# Patient Record
Sex: FEMALE | Race: WHITE | Employment: OTHER | URBAN - METROPOLITAN AREA
[De-identification: names, ages, dates, MRNs, and addresses within clinical notes are randomized per-mention and may not be internally consistent; named-entity substitution may affect disease eponyms.]

---

## 2017-06-11 DIAGNOSIS — E78.5 HYPERLIPIDEMIA, UNSPECIFIED HYPERLIPIDEMIA TYPE: ICD-10-CM

## 2017-06-12 RX ORDER — ATORVASTATIN CALCIUM 10 MG/1
TABLET, FILM COATED ORAL
Qty: 90 TAB | Refills: 3 | Status: SHIPPED | OUTPATIENT
Start: 2017-06-12 | End: 2017-07-13 | Stop reason: SDUPTHER

## 2017-07-05 ENCOUNTER — HOSPITAL ENCOUNTER (OUTPATIENT)
Dept: LAB | Age: 62
Discharge: HOME | End: 2017-07-05
Payer: COMMERCIAL

## 2017-07-05 LAB
CHOLEST SERPL-MCNC: 173 MG/DL
HBA1C MFR BLD: 5.5 % (ref 4.2–5.6)
HDLC SERPL-MCNC: 60 MG/DL (ref 40–60)
HDLC SERPL: 2.9 {RATIO} (ref 0–5)
LDLC SERPL CALC-MCNC: 72.4 MG/DL (ref 0–100)
LIPID PROFILE,FLP: ABNORMAL
TRIGL SERPL-MCNC: 203 MG/DL (ref ?–150)
VLDLC SERPL CALC-MCNC: 40.6 MG/DL

## 2017-07-05 PROCEDURE — 83036 HEMOGLOBIN GLYCOSYLATED A1C: CPT | Performed by: INTERNAL MEDICINE

## 2017-07-05 PROCEDURE — 80061 LIPID PANEL: CPT | Performed by: INTERNAL MEDICINE

## 2017-07-05 PROCEDURE — 36415 COLL VENOUS BLD VENIPUNCTURE: CPT | Performed by: INTERNAL MEDICINE

## 2017-07-09 NOTE — PROGRESS NOTES
59 y.o. white female who presents for eval    She continues to do well from spine standpoint    No cardiovascular complaints and she's back to her routine exercise    Denies polyuria, polydipsia, nocturia, vision change. Not checking sugars at this time. Denies GI or Gu complaints    Tragically, her   suddenly last week while they were in the process of going to the doctor to be evaluated. The  felt he  from ruptured AAA although no autopsy was done. She's grieving but denies aaliyah depression, sleeping ok    Past Medical History:   Diagnosis Date    Colon polyp 2017    Dr Austin Pritchett    DJD (degenerative joint disease)     FHx: heart disease     Hyperlipidemia     calc risk score 1.9 from (); taking statins due to strong FH    IFG (impaired fasting glucose)     Macular degeneration     Dr. Armani Olvera Osteopenia     DEXA t score spine 3.5, hip -2.0 (); 0.9 spine, -2.0 hip FRAX 10/1.4 ();  4.5 spine, -2.1 hip (10/16) Dr Maegan Reeves;  Fosamax [phone]    Right sided sciatica 2016    Dr Thao Hays in [facility]; Dr Gil Jones; [facility]  showed severe multilvevel degen change, large right L4-5 synovial cyst w impingment of R L5 with moderate to severe central stenosis, severe L3-4, mod L2-3 central stenosis and L2-3 left, L3-4bilat, L4-5 left foraminal stenosis    Zoster       Past Surgical History:   Procedure Laterality Date    HX BLADDER SUSPENSION  2006    Dr. Xiao Pritchett  hemorrhoids; 3/17 polyp    HX DILATION AND CURETTAGE      HX DILATION AND CURETTAGE      HX GYN      conization    HX HYSTERECTOMY      NEUROLOGICAL PROCEDURE UNLISTED  2016    partial right L4-5 laminectomy for resection of large synovial cyst Dr Monika Forrester Marital status:      Spouse name: N/A    Number of children: 1    Years of education: N/A     Occupational History          Social History Main Topics    Smoking status: Never Smoker    Smokeless tobacco: Never Used    Alcohol use 0.0 oz/week     0 Standard drinks or equivalent per week      Comment: social    Drug use: No    Sexual activity: Not on file     Other Topics Concern    Not on file     Social History Narrative     Current Outpatient Prescriptions   Medication Sig    atorvastatin (LIPITOR) 10 mg tablet TAKE 1 TAB BY MOUTH DAILY.  ANTIOX #8/OM3/DHA/EPA/LUT/ZEAX (PRESERVISION AREDS 2, OMEGA-3, PO) Take  by mouth.  estradiol (ESTRACE) 1 mg tablet Take 1 mg by mouth daily.  conjugated estrogens (PREMARIN) 0.625 mg/gram vaginal cream Insert 0.5 g into vagina daily.  clobetasol (TEMOVATE) 0.05 % ointment Apply  to affected area two (2) times a day.  omega-3 fatty acids-vitamin e (FISH OIL) 1,000 mg Cap Take 1 Cap by mouth.  calcium-cholecalciferol, d3, (CALCIUM 600 + D) 600-125 mg-unit Tab Take  by mouth.  MULTIVITAMIN PO Take  by mouth. No current facility-administered medications for this visit.       Allergies   Allergen Reactions    Qsymia [Phentermine-Topiramate] Other (comments)     Insomnia       REVIEW OF SYSTEMS:  Dr. Pan Matias 2016, mammo 5/16, DEXA 10/16, colo 3/17 Dr Chaim Pemberton  Ophtho - no vision change or eye pain  Oral - no mouth pain, tongue or tooth problems  Ears - no hearing loss, ear pain, fullness, no swallowing problems  Cardiac - no CP, PND, orthopnea, edema, palpitations or syncope  Chest - no breast masses  Resp - no wheezing, chronic coughing, dyspnea  GI - no heartburn, nausea, vomiting, change in bowel habits, bleeding, hemorrhoids  Urinary - no dysuria, hematuria, flank pain, urgency, frequency  Constitutional - no wt loss, night sweats, unexplained fevers  Neuro - no focal weakness, numbness, paresthesias, incoordination, ataxia, involuntary movements  Endo - no polyuria, polydipsia, nocturia, hot flashes    Visit Vitals    /82 (BP 1 Location: Right arm, BP Patient Position: Sitting)    Pulse 95    Temp 98.3 °F (36.8 °C) (Oral)    Resp 17    Ht 5' 3\" (1.6 m)    Wt 145 lb 8 oz (66 kg)    SpO2 97%    BMI 25.77 kg/m2     A&O x3  Affect is appropriate. Mood stable  No apparent distress  Anicteric, no JVD, adenopathy or thyromegaly. No carotid bruits or radiated murmur  Lungs clear to auscultation, no wheezes or rales  Heart showed regular rate and rhythm. No murmur, rubs, gallops  Abdomen soft nontender, no hepatosplenomegaly or masses. Extremities without edema.   Pulses 1-2+ symmetrically    LABS  From 1/12 showed   gluc 94,   cr 0.74, gfr 90,  alt 17,                   ldl-p 2079, chol 246, tg 154, hdl 53, ldl-c 162, wbc 5.0, hb 12.4, plt 275  From 5/12 showed   gluc 106, cr 0.82                         hba1c 5.8, ldl-p 1162, chol 159, tg 150, hdl 49, ldl-c 80,         tsh 1.35  From 11/12 showed gluc 100, cr 0.89                         hba1c 5.8, ldl-p 976,   chol 152, tg 167, hdl 49, ldl-c 70  From 5/13 showed                   hba1c 5.8,                   chol 165, tg 111, hdl 54, ldl-c 89  From 12/13 showed gluc 99,   cr 0.92, gfr 68,  alt 12, hba1c 5.7,               chol 155, tg 115, hdl 51, ldl-c 81  From 6/14 showed   gluc 92,   cr 1.03, gfr 55,  alt 13, hba1c 5.6,     chol 161, tg 98,   hdl 54, ldl-c 87,  wbc 5.6, hb 13.6, plt 257, tsh 1.09  From 12/14 showed        hba1c 5.8,     chol 158, tg 108, hdl 64, ldl-c 72  From 6/15 showed   gluc 98,   cr 0.89, gfr>60, alt 10, hba1c 5.9,     chol 151, tg 81,   hdl 56, ldl-c 79  From 12/15 showed        hba1c 5.6,     chol 161, tg 141, hdl 68, ldl-c 65  From 6/16 showed   gluc 99,   cr 0.88, gfr>60,   hba1c 5.8,               wbc 5.6, hb 12.9, plt 252, hep c neg  From 11/16 showed gluc 93,   cr 0.75, gfr>60,                 wbc 6.1, hb 12.1, plt 247  From 12/16 showed        hba1c 5.0,     chol 181, tg 119, hdl 76, ldl-c 81    Results for orders placed or performed during the hospital encounter of 07/05/17   LIPID PANEL   Result Value Ref Range    LIPID PROFILE          Cholesterol, total 173 <200 MG/DL    Triglyceride 203 (H) <150 MG/DL    HDL Cholesterol 60 40 - 60 MG/DL    LDL, calculated 72.4 0 - 100 MG/DL    VLDL, calculated 40.6 MG/DL    CHOL/HDL Ratio 2.9 0 - 5.0     HEMOGLOBIN A1C W/O EAG   Result Value Ref Range    Hemoglobin A1c 5.5 4.2 - 5.6 %     Patient Active Problem List   Diagnosis Code    Osteopenia FRAX 8/14 10 and 1.4 M85.80    Hyperlipidemia E78.5    Macular degeneration Dr. Theodore Kennedy H35.30    IFG (impaired fasting glucose) R73.01    Arthritis, degenerative M19.90    GERD without esophagitis K21.9    Advance directive in chart Z78.9     ASSESSMENT AND PLAN  1.  DJD. Prn nsaids  2. AMD.  F/U Dr. Theodore Kennedy  3. GERD. Prn PPI and avoidance measures  4. Osteopenia. Ca/D/wt bearing exercises. 5.  Hyperlipidemia and strong FH chd.  Continue lipitor and at target  6. IFG. Weight loss, continue dietary and lifestyle measures  7. Colon polyp. Fiber, colo 2022 Dr Rigoberto Berry  8. Spine. F/U Dr Christopher Altamirano  9. Grief reaction. Long discussion, she wants no meds for now and will call if things worsen      RTC 1/18    Above conditions discussed at length and patient vocalized understanding.   All questions answered to patient satifaction

## 2017-07-13 ENCOUNTER — OFFICE VISIT (OUTPATIENT)
Dept: INTERNAL MEDICINE CLINIC | Age: 62
End: 2017-07-13

## 2017-07-13 VITALS
TEMPERATURE: 98.3 F | SYSTOLIC BLOOD PRESSURE: 134 MMHG | HEART RATE: 95 BPM | OXYGEN SATURATION: 97 % | BODY MASS INDEX: 25.78 KG/M2 | DIASTOLIC BLOOD PRESSURE: 82 MMHG | RESPIRATION RATE: 17 BRPM | WEIGHT: 145.5 LBS | HEIGHT: 63 IN

## 2017-07-13 DIAGNOSIS — E78.5 HYPERLIPIDEMIA, UNSPECIFIED HYPERLIPIDEMIA TYPE: ICD-10-CM

## 2017-07-13 DIAGNOSIS — R73.01 IFG (IMPAIRED FASTING GLUCOSE): Primary | ICD-10-CM

## 2017-07-13 RX ORDER — ATORVASTATIN CALCIUM 10 MG/1
TABLET, FILM COATED ORAL
Qty: 90 TAB | Refills: 3 | Status: SHIPPED | OUTPATIENT
Start: 2017-07-13 | End: 2018-06-18 | Stop reason: SDUPTHER

## 2017-07-13 NOTE — MR AVS SNAPSHOT
Visit Information Date & Time Provider Department Dept. Phone Encounter #  
 7/13/2017  1:30 PM Brooke Lin MD Internist of [facility] [address] [id number] Your Appointments 3/15/2018  9:35 AM  
LAB with [facility] NURSE VISIT Internist of [facility] ([facility]) Appt Note: lab  
 [address] [facility], [address] [place], [place] [address]e [address] [place]  
  
   
 [address] [facility]  
  
    
 3/22/2018 10:00 AM  
PHYSICAL with Brooke Lin MD  
Internist of [facility]) Appt Note: pe pt will be out of town [address], [address] [place] [address]  
  
   
 [address], [facility] Upcoming Health Maintenance Date Due  
 PAP AKA CERVICAL CYTOLOGY 10/23/1974 INFLUENZA AGE 9 TO ADULT 8/1/2017 BREAST CANCER SCRN MAMMOGRAM 6/14/2018 DTaP/Tdap/Td series (2 - Td) 6/11/2024 COLONOSCOPY 3/28/2027 Allergies as of 7/13/2017  Review Complete On: 7/13/2017 By: Kym Zhao LPN Severity Noted Reaction Type Reactions Qsymia [Phentermine-topiramate]  12/06/2013    Other (comments) Insomnia Current Immunizations  Reviewed on 12/11/2014 Name Date Tdap 6/11/2014 Zoster Vaccine, Live 12/11/2014  3:11 PM  
  
 Not reviewed this visit You Were Diagnosed With   
  
 Codes Comments Hyperlipidemia, unspecified hyperlipidemia type     ICD-10-CM: E78.5 ICD-9-CM: 272.4 Vitals BP Pulse Temp Resp Height(growth percentile) Weight(growth percentile) (!) 162/92 (BP 1 Location: Right arm, BP Patient Position: Sitting) 95 98.3 °F (36.8 °C) (Oral) 17 5' 3\" (1.6 m) 145 lb 8 oz (66 kg) SpO2 BMI OB Status Smoking Status 97% 25.77 kg/m2 Hysterectomy Never Smoker Vitals History BMI and BSA Data Body Mass Index Body Surface Area 25.77 kg/m 2 1.71 m 2 Preferred Pharmacy Pharmacy Name Phone [facility]/PHARMACY #[id number] MADRIGAL [phone] Your Updated Medication List  
  
   
This list is accurate as of: 7/13/17  2:11 PM.  Always use your most recent med list.  
  
  
  
  
 atorvastatin 10 mg tablet Commonly known as:  LIPITOR  
TAKE 1 TAB BY MOUTH DAILY. CALCIUM 600 + D 600-125 mg-unit Tab Generic drug:  calcium-cholecalciferol (d3) Take  by mouth.  
  
 clobetasol 0.05 % ointment Commonly known as:  Cheyanne Carey Apply  to affected area two (2) times a day. ESTRACE 1 mg tablet Generic drug:  estradiol Take 1 mg by mouth daily. FISH OIL 1,000 mg Cap Generic drug:  omega-3 fatty acids-vitamin e Take 1 Cap by mouth. MULTIVITAMIN PO Take  by mouth. PREMARIN 0.625 mg/gram vaginal cream  
Generic drug:  conjugated estrogens Insert 0.5 g into vagina daily. PRESERVISION AREDS 2 (OMEGA-3) PO Take  by mouth. Introducing [facility]! Dear Gifty Almeida: 
Thank you for requesting a [email or web address] account. Our records indicate that you already have an active [email or web address] account. You can access your account anytime at [email or web address] Did you know that you can access your hospital and ER discharge instructions at any time in [email or web address]? You can also review all of your test results from your hospital stay or ER visit. Additional Information If you have questions, please visit the Frequently Asked Questions section of the [email or web address] website at [email or web address] Remember, [email or web address] is NOT to be used for urgent needs. For medical emergencies, dial 911. Now available from your iPhone and Android! Please provide this summary of care documentation to your next provider. Your primary care clinician is listed as Katelynn Agosto. If you have any questions after today's visit, please call [phone].

## 2017-08-01 ENCOUNTER — HOSPITAL ENCOUNTER (OUTPATIENT)
Dept: MAMMOGRAPHY | Age: 62
Discharge: HOME | End: 2017-08-01
Attending: INTERNAL MEDICINE
Payer: COMMERCIAL

## 2017-08-01 DIAGNOSIS — Z12.31 VISIT FOR SCREENING MAMMOGRAM: ICD-10-CM

## 2017-08-01 PROCEDURE — 77067 SCR MAMMO BI INCL CAD: CPT

## 2017-10-06 ENCOUNTER — OFFICE VISIT (OUTPATIENT)
Dept: INTERNAL MEDICINE CLINIC | Age: 62
End: 2017-10-06

## 2017-10-06 VITALS
WEIGHT: 141 LBS | OXYGEN SATURATION: 99 % | TEMPERATURE: 98.1 F | BODY MASS INDEX: 24.98 KG/M2 | RESPIRATION RATE: 14 BRPM | SYSTOLIC BLOOD PRESSURE: 150 MMHG | DIASTOLIC BLOOD PRESSURE: 86 MMHG | HEIGHT: 63 IN | HEART RATE: 74 BPM

## 2017-10-06 DIAGNOSIS — H61.21 IMPACTED CERUMEN OF RIGHT EAR: Primary | ICD-10-CM

## 2017-10-06 NOTE — PROGRESS NOTES
1. Have you been to the ER, urgent care clinic or hospitalized since your last visit? NO.     2. Have you seen or consulted any other health care providers outside of the [address] [place] [address] since your last visit (Include any pap smears or colon screening)? NO      Do you have an Advanced Directive?  YES

## 2017-10-06 NOTE — MR AVS SNAPSHOT
Visit Information Date & Time Provider Department Dept. Phone Encounter #  
 10/6/2017 11:30 AM Dasha Marshallma Internist of [address] [id number] Your Appointments 3/28/2018 10:05 AM  
LAB with [facility] NURSE VISIT Internist of [facility] Appt Note: lab; lab  
 [address] [facility] [address], [address] [facility] [address]  
  
   
 [address] [facility] [phone]  
  
    
 4/5/2018  2:00 PM  
PHYSICAL with Jackson Macdonald MD  
Internist of [facility]) Appt Note: pe pt will be out of town [address]; pe  
 [address] [facility] [address], [address] [facility] [address]  
  
   
 [address], [facility] Upcoming Health Maintenance Date Due INFLUENZA AGE 9 TO ADULT 8/1/2017 BREAST CANCER SCRN MAMMOGRAM 8/1/2019 DTaP/Tdap/Td series (2 - Td) 6/11/2024 COLONOSCOPY 3/28/2027 Allergies as of 10/6/2017  Review Complete On: 10/6/2017 By: ERVIN Marshall Severity Noted Reaction Type Reactions Qsymia [Phentermine-topiramate]  12/06/2013    Other (comments) Insomnia Current Immunizations  Reviewed on 12/11/2014 Name Date Tdap 6/11/2014 Zoster Vaccine, Live 12/11/2014  3:11 PM  
  
 Not reviewed this visit You Were Diagnosed With   
  
 Codes Comments Impacted cerumen of right ear    -  Primary ICD-10-CM: H61.21 ICD-9-CM: 380.4 Vitals BP Pulse Temp Resp Height(growth percentile) Weight(growth percentile) 150/86 (BP 1 Location: Right arm, BP Patient Position: Sitting) 74 98.1 °F (36.7 °C) (Oral) 14 5' 3\" (1.6 m) 141 lb (64 kg) SpO2 BMI OB Status Smoking Status 99% 24.98 kg/m2 Hysterectomy Never Smoker Vitals History BMI and BSA Data Body Mass Index Body Surface Area 24.98 kg/m 2 1.69 m 2 Preferred Pharmacy Pharmacy Name Phone [facility]/PHARMACY #[id number]- Aidan ROLON [phone] Your Updated Medication List  
  
   
This list is accurate as of: 10/6/17 12:01 PM.  Always use your most recent med list.  
  
  
  
  
 atorvastatin 10 mg tablet Commonly known as:  LIPITOR  
TAKE 1 TAB BY MOUTH DAILY. CALCIUM 600 + D 600-125 mg-unit Tab Generic drug:  calcium-cholecalciferol (d3) Take  by mouth.  
  
 clobetasol 0.05 % ointment Commonly known as:  Sage Jackson Apply  to affected area two (2) times a day. ESTRACE 1 mg tablet Generic drug:  estradiol Take 1 mg by mouth daily. FISH OIL 1,000 mg Cap Generic drug:  omega-3 fatty acids-vitamin e Take 1 Cap by mouth. MULTIVITAMIN PO Take  by mouth. PREMARIN 0.625 mg/gram vaginal cream  
Generic drug:  conjugated estrogens Insert 0.5 g into vagina daily. PRESERVISION AREDS 2 (OMEGA-3) PO Take  by mouth. Introducing [facility]! Dear Ranjeet Del Cid: 
Thank you for requesting a mWater account. Our records indicate that you already have an active mWater account. You can access your account anytime at [email or web address] Did you know that you can access your hospital and ER discharge instructions at any time in mWater? You can also review all of your test results from your hospital stay or ER visit. Additional Information If you have questions, please visit the Frequently Asked Questions section of the mWater website at [email or web address] Remember, mWater is NOT to be used for urgent needs. For medical emergencies, dial 911. Now available from your iPhone and Android! Please provide this summary of care documentation to your next provider. Your primary care clinician is listed as Jaleesa Lozoya. If you have any questions after today's visit, please call [phone].

## 2017-10-06 NOTE — PROGRESS NOTES
HPI/History  Robin Eid is a 59 y.o.  female who presents for evaluation. Pt reports right ear fullness for at least 2 wks but probably longer. Present more than absent and sometimes with muffled hearing. No pain, otorrhea, or other complaints. No URI/cold/allergy sxs. Has tried softeners but no cerumen noted to be removed. Last softener use was last night. No other sxs or complaints. Patient Active Problem List   Diagnosis Code    Osteopenia FRAX 8/14 10 and 1.4 M85.80    Hyperlipidemia E78.5    Macular degeneration Dr. Que Guevara H35.30    IFG (impaired fasting glucose) R73.01    Arthritis, degenerative M19.90    GERD without esophagitis K21.9    Advance directive in chart Z78.9     Past Medical History:   Diagnosis Date    Colon polyp 03/2017    Dr Kiko FIELDSD (degenerative joint disease)     FHx: heart disease     Hyperlipidemia     calc risk score 1.9 from (1/12); taking statins due to strong FH    IFG (impaired fasting glucose) 5/12    Macular degeneration     Dr. Lili Barrett Osteopenia     DEXA t score spine 3.5, hip -2.0 (5/12); 0.9 spine, -2.0 hip FRAX 10/1.4 (8/14);  4.5 spine, -2.1 hip (10/16) Dr Larry Carbajal;  Fosamax [phone]    Right sided sciatica 03/2016    Dr Chavez Chavarria in [facility]; Dr Naren Art; [facility] 7/16 showed severe multilvevel degen change, large right L4-5 synovial cyst w impingment of R L5 with moderate to severe central stenosis, severe L3-4, mod L2-3 central stenosis and L2-3 left, L3-4bilat, L4-5 left foraminal stenosis    Zoster 1980s      Past Surgical History:   Procedure Laterality Date    HX BLADDER SUSPENSION  4/2006    Dr. Silvino Valerio 2006 hemorrhoids; 3/17 polyp    HX DILATION AND CURETTAGE  1985    HX DILATION AND CURETTAGE  1988    HX GYN      conization    HX HYSTERECTOMY  2001    NEUROLOGICAL PROCEDURE UNLISTED  11/2016    partial right L4-5 laminectomy for resection of large synovial cyst Dr Araseli Vazquez History Social History    Marital status:      Spouse name: N/A    Number of children: 1    Years of education: N/A     Occupational History          Social History Main Topics    Smoking status: Never Smoker    Smokeless tobacco: Never Used    Alcohol use 0.0 oz/week     0 Standard drinks or equivalent per week      Comment: social    Drug use: No    Sexual activity: Not on file     Other Topics Concern    Not on file     Social History Narrative     Family History   Problem Relation Age of Onset    Heart Disease Mother     Other Father      ESRD    Heart Disease Father     Heart Disease Brother     Elevated Lipids Brother     Diabetes Brother     Cancer Maternal Aunt 54     breast    Diabetes Other      maternal cousin breast     Current Outpatient Prescriptions   Medication Sig    atorvastatin (LIPITOR) 10 mg tablet TAKE 1 TAB BY MOUTH DAILY.  ANTIOX #8/OM3/DHA/EPA/LUT/ZEAX (PRESERVISION AREDS 2, OMEGA-3, PO) Take  by mouth.  estradiol (ESTRACE) 1 mg tablet Take 1 mg by mouth daily.  conjugated estrogens (PREMARIN) 0.625 mg/gram vaginal cream Insert 0.5 g into vagina daily.  clobetasol (TEMOVATE) 0.05 % ointment Apply  to affected area two (2) times a day.  calcium-cholecalciferol, d3, (CALCIUM 600 + D) 600-125 mg-unit Tab Take  by mouth.  MULTIVITAMIN PO Take  by mouth.  omega-3 fatty acids-vitamin e (FISH OIL) 1,000 mg Cap Take 1 Cap by mouth. No current facility-administered medications for this visit. Allergies   Allergen Reactions    Qsymia [Phentermine-Topiramate] Other (comments)     Insomnia         Review of Systems  Aside from those included in HPI, remainder of ROS negative.     Physical Examination  Visit Vitals    /86 (BP 1 Location: Right arm, BP Patient Position: Sitting)    Pulse 74    Temp 98.1 °F (36.7 °C) (Oral)    Resp 14    Ht 5' 3\" (1.6 m)    Wt 141 lb (64 kg)    SpO2 99%    BMI 24.98 kg/m2       General - Alert and in no acute distress. Pt appears well, comfortable, and in good spirits. Pleasant, engaging. Nontoxic. Not anxious, non-diaphoretic. Mental status - Appropriate mood, behavior, speech content, dress, and thought processes. Eyes - No periorbital findings. Pupils equal and reactive, extraocular movements intact. No erythema or discharge. Ears - No external findings or discomfort with manipulation. No mastoidal findings. Right canal with light colored cerumen impaction. Left canal and TM unremarkable. Hearing intact. Nose - No erythema. No rhinorrhea. Pulm - No tachypnea, retractions, or cyanosis. Good respiratory effort. Cardiovascular - Normal rate. Partial success with irrigation today. No complications or issues. No signs of infection. However, with our limited success, I decided to stop irrigation and refer to ENT. Assessment and Plan  1. Right cerumen impaction - Partial success with irrigation today, no complications. May be enough but will refer to ENT in case further tx needed. Pt will be out of town sporadically in the near future and next available 10/16-18. Further planning as warranted. Pt happily agrees with plan. PLEASE NOTE:   This document has been produced using voice recognition software. Unrecognized errors in transcription may be present.     Nina Acevedo [employer] of [place] Cee  [phone]  10/6/2017

## 2018-03-28 ENCOUNTER — HOSPITAL ENCOUNTER (OUTPATIENT)
Dept: LAB | Age: 63
Discharge: HOME | End: 2018-03-28
Payer: COMMERCIAL

## 2018-03-28 ENCOUNTER — APPOINTMENT (OUTPATIENT)
Dept: INTERNAL MEDICINE CLINIC | Age: 63
End: 2018-03-28

## 2018-03-28 DIAGNOSIS — E78.5 HYPERLIPIDEMIA, UNSPECIFIED HYPERLIPIDEMIA TYPE: ICD-10-CM

## 2018-03-28 LAB
ALBUMIN SERPL-MCNC: 3.8 G/DL (ref 3.4–5)
ALBUMIN/GLOB SERPL: 1.1 {RATIO} (ref 0.8–1.7)
ALP SERPL-CCNC: 67 U/L (ref 45–117)
ALT SERPL-CCNC: 23 U/L (ref 13–56)
ANION GAP SERPL CALC-SCNC: 5 MMOL/L (ref 3–18)
AST SERPL-CCNC: 17 U/L (ref 15–37)
BILIRUB SERPL-MCNC: 0.9 MG/DL (ref 0.2–1)
BUN SERPL-MCNC: 18 MG/DL (ref 7–18)
BUN/CREAT SERPL: 23 (ref 12–20)
CALCIUM SERPL-MCNC: 9.2 MG/DL (ref 8.5–10.1)
CHLORIDE SERPL-SCNC: 105 MMOL/L (ref 100–108)
CO2 SERPL-SCNC: 31 MMOL/L (ref 21–32)
CREAT SERPL-MCNC: 0.8 MG/DL (ref 0.6–1.3)
ERYTHROCYTE [DISTWIDTH] IN BLOOD BY AUTOMATED COUNT: 13.5 % (ref 11.6–14.5)
GLOBULIN SER CALC-MCNC: 3.4 G/DL (ref 2–4)
GLUCOSE SERPL-MCNC: 96 MG/DL (ref 74–99)
HCT VFR BLD AUTO: 40.5 % (ref 35–45)
HGB BLD-MCNC: 13.3 G/DL (ref 12–16)
MCH RBC QN AUTO: 30.6 PG (ref 24–34)
MCHC RBC AUTO-ENTMCNC: 32.8 G/DL (ref 31–37)
MCV RBC AUTO: 93.1 FL (ref 74–97)
PLATELET # BLD AUTO: 251 K/UL (ref 135–420)
PMV BLD AUTO: 10.6 FL (ref 9.2–11.8)
POTASSIUM SERPL-SCNC: 4.2 MMOL/L (ref 3.5–5.5)
PROT SERPL-MCNC: 7.2 G/DL (ref 6.4–8.2)
RBC # BLD AUTO: 4.35 M/UL (ref 4.2–5.3)
SODIUM SERPL-SCNC: 141 MMOL/L (ref 136–145)
WBC # BLD AUTO: 6.4 K/UL (ref 4.6–13.2)

## 2018-03-28 PROCEDURE — 85027 COMPLETE CBC AUTOMATED: CPT | Performed by: INTERNAL MEDICINE

## 2018-03-28 PROCEDURE — 36415 COLL VENOUS BLD VENIPUNCTURE: CPT | Performed by: INTERNAL MEDICINE

## 2018-03-28 PROCEDURE — 80053 COMPREHEN METABOLIC PANEL: CPT | Performed by: INTERNAL MEDICINE

## 2018-04-01 NOTE — PROGRESS NOTES
57 y.o. white female who presents for RPE    She seems to be doing ok. Emotionally still grieving from her 's death but denied being depressed. She spent a couple months down near [place] and did ok. Her house renovation (which was started right around when her   unexpectedly) has not progressed which keeps bringing back memories. No cardiovascular complaints and she's trying to get back to her exercise routine    Denies polyuria, polydipsia, nocturia, vision change. Not checking sugars at this time. Weight is stable    Vitals 2018 10/6/2017 2017 2016 2016   Weight 145 lb 141 lb 145 lb 8 oz 145 lb 148 lb     Denies GI or Gu complaints    Past Medical History:   Diagnosis Date    Colon polyp 2017    Dr Ruel Roberts    DJD (degenerative joint disease)     FHx: heart disease     Hyperlipidemia     calc risk score 1.9 from (); taking statins due to strong FH    IFG (impaired fasting glucose)     Macular degeneration     Dr. Denisa Saldaña Osteopenia     DEXA t score spine 3.5, hip -2.0 (); 0.9 spine, -2.0 hip FRAX 101.4 ();  4.5 spine, -2.1 hip (10/16) Dr Bethanie Ann;  Fosamax [phone]    Right sided sciatica 2016    Dr Ofelia Weldon in [facility]; Dr Sherice Raya; Roderick Todd  showed severe multilvevel degen change, large right L4-5 synovial cyst w impingment of R L5 with moderate to severe central stenosis, severe L3-4, mod L2-3 central stenosis and L2-3 left, L3-4bilat, L4-5 left foraminal stenosis    Zoster       Past Surgical History:   Procedure Laterality Date    HX BLADDER SUSPENSION  2006    Dr. Reji Roberts  hemorrhoids; 3/17 polyp    HX [address],     HX HYSTERECTOMY      NEUROLOGICAL PROCEDURE UNLISTED  2016    partial right L4-5 laminectomy for resection of large synovial cyst Dr Mat Plummer Marital status:      Spouse name: N/A    Number of children: 1    Years of education: N/A     Occupational History          Social History Main Topics    Smoking status: Never Smoker    Smokeless tobacco: Never Used    Alcohol use 0.0 oz/week     0 Standard drinks or equivalent per week      Comment: social    Drug use: No    Sexual activity: Not on file     Other Topics Concern    Not on file     Social History Narrative     Current Outpatient Prescriptions   Medication Sig    ESTROGENS,CONJ/BAZEDOXIFENE (DUAVEE PO) Take  by mouth.  atorvastatin (LIPITOR) 10 mg tablet TAKE 1 TAB BY MOUTH DAILY.  ANTIOX #8/OM3/DHA/EPA/LUT/ZEAX (PRESERVISION AREDS 2, OMEGA-3, PO) Take  by mouth.  conjugated estrogens (PREMARIN) 0.625 mg/gram vaginal cream Insert 0.5 g into vagina daily.  clobetasol (TEMOVATE) 0.05 % ointment Apply  to affected area two (2) times a day.  calcium-cholecalciferol, d3, (CALCIUM 600 + D) 600-125 mg-unit Tab Take  by mouth.  MULTIVITAMIN PO Take  by mouth. No current facility-administered medications for this visit.       Allergies   Allergen Reactions    Qsymia [Phentermine-Topiramate] Other (comments)     Insomnia       REVIEW OF SYSTEMS:  Dr. Melonie Bryan 10/17, mammo 5/16, DEXA 8/17, colo 3/17 Dr Wilmer Madrid  Ophtho  no vision change or eye pain  Oral  no mouth pain, tongue or tooth problems  Ears  no hearing loss, ear pain, fullness, no swallowing problems  Cardiac  no CP, PND, orthopnea, edema, palpitations or syncope  Chest  no breast masses  Resp  no wheezing, chronic coughing, dyspnea  GI  no heartburn, nausea, vomiting, change in bowel habits, bleeding, hemorrhoids  Urinary  no dysuria, hematuria, flank pain, urgency, frequency  Constitutional  no wt loss, night sweats, unexplained fevers  Neuro  no focal weakness, numbness, paresthesias, incoordination, ataxia, involuntary movements  Endo - no polyuria, polydipsia, nocturia, hot flashes    Visit Vitals    /80 (BP 1 Location: Left arm, BP Patient Position: Sitting)    Pulse 76    Temp 98.8 °F (37.1 °C) (Oral)    Ht 5' 3\" (1.6 m)    Wt 145 lb (65.8 kg)    SpO2 98%    BMI 25.69 kg/m2     A&O x3  Affect is appropriate. Mood stable  No apparent distress  Anicteric, no JVD, adenopathy or thyromegaly. No carotid bruits or radiated murmur  Lungs clear to auscultation, no wheezes or rales  Heart showed regular rate and rhythm. No murmur, rubs, gallops  Abdomen soft nontender, no hepatosplenomegaly or masses. Extremities without edema.   Pulses 1-2+ symmetrically    LABS  From 1/12 showed   gluc 94,   cr 0.74, gfr 90,  alt 17,                   ldl-p 2079, chol 246, tg 154, hdl 53, ldl-c 162, wbc 5.0, hb 12.4, plt 275  From 5/12 showed   gluc 106, cr 0.82                         hba1c 5.8, ldl-p 1162, chol 159, tg 150, hdl 49, ldl-c 80,         tsh 1.35  From 11/12 showed gluc 100, cr 0.89                         hba1c 5.8, ldl-p 976,   chol 152, tg 167, hdl 49, ldl-c 70  From 5/13 showed                   hba1c 5.8,                   chol 165, tg 111, hdl 54, ldl-c 89  From 12/13 showed gluc 99,   cr 0.92, gfr 68,  alt 12, hba1c 5.7,               chol 155, tg 115, hdl 51, ldl-c 81  From 6/14 showed   gluc 92,   cr 1.03, gfr 55,  alt 13, hba1c 5.6,     chol 161, tg 98,   hdl 54, ldl-c 87,  wbc 5.6, hb 13.6, plt 257, tsh 1.09  From 12/14 showed        hba1c 5.8,     chol 158, tg 108, hdl 64, ldl-c 72  From 6/15 showed   gluc 98,   cr 0.89, gfr>60, alt 10, hba1c 5.9,     chol 151, tg 81,   hdl 56, ldl-c 79  From 12/15 showed        hba1c 5.6,     chol 161, tg 141, hdl 68, ldl-c 65  From 6/16 showed   gluc 99,   cr 0.88, gfr>60,   hba1c 5.8,               wbc 5.6, hb 12.9, plt 252, hep c neg  From 11/16 showed gluc 93,   cr 0.75, gfr>60,                 wbc 6.1, hb 12.1, plt 247  From 12/16 showed        hba1c 5.0,     chol 181, tg 119, hdl 76, ldl-c 81  From 7/17 showed        hba1c 5.5,     chol 173, tg 203, hdl 60, ldl-c 72    Results for orders placed or performed during the hospital encounter of 21/22/39   METABOLIC PANEL, COMPREHENSIVE   Result Value Ref Range    Sodium 141 136 - 145 mmol/L    Potassium 4.2 3.5 - 5.5 mmol/L    Chloride 105 100 - 108 mmol/L    CO2 31 21 - 32 mmol/L    Anion gap 5 3.0 - 18 mmol/L    Glucose 96 74 - 99 mg/dL    BUN 18 7.0 - 18 MG/DL    Creatinine 0.80 0.6 - 1.3 MG/DL    BUN/Creatinine ratio 23 (H) 12 - 20      GFR est AA >60 >60 ml/min/1.73m2    GFR est non-AA >60 >60 ml/min/1.73m2    Calcium 9.2 8.5 - 10.1 MG/DL    Bilirubin, total 0.9 0.2 - 1.0 MG/DL    ALT (SGPT) 23 13 - 56 U/L    AST (SGOT) 17 15 - 37 U/L    Alk. phosphatase 67 45 - 117 U/L    Protein, total 7.2 6.4 - 8.2 g/dL    Albumin 3.8 3.4 - 5.0 g/dL    Globulin 3.4 2.0 - 4.0 g/dL    A-G Ratio 1.1 0.8 - 1.7     CBC W/O DIFF   Result Value Ref Range    WBC 6.4 4.6 - 13.2 K/uL    RBC 4.35 4.20 - 5.30 M/uL    HGB 13.3 12.0 - 16.0 g/dL    HCT 40.5 35.0 - 45.0 %    MCV 93.1 74.0 - 97.0 FL    MCH 30.6 24.0 - 34.0 PG    MCHC 32.8 31.0 - 37.0 g/dL    RDW 13.5 11.6 - 14.5 %    PLATELET 927 089 - 401 K/uL    MPV 10.6 9.2 - 11.8 FL     Patient Active Problem List   Diagnosis Code    Osteopenia FRAX 8/14 10 and 1.4 M85.80    Hyperlipidemia E78.5    Macular degeneration Dr. Orlin Roberts H35.30    IFG (impaired fasting glucose) R73.01    Arthritis, degenerative M19.90    GERD without esophagitis K21.9    Advance directive in chart Z78.9     ASSESSMENT AND PLAN  1.  DJD. Prn nsaids  2. AMD.  F/U Dr. Orlin Roberts  3. GERD. Prn PPI and avoidance measures  4. Osteopenia. Ca/D/wt bearing exercises. 5.  Hyperlipidemia and strong FH chd.  Continue lipitor   6. IFG. Weight loss, continue dietary and lifestyle measures  7. Colon polyp. Fiber, colo 2022 Dr Stephane Garland  8. Spine. F/U Dr Qian Martinez  9. Overweight. Lifestyle and dietary measures. Portion control reiterated. RTC 4/19    Above conditions discussed at length and patient vocalized understanding.   All questions answered to patient satifaction

## 2018-04-05 ENCOUNTER — OFFICE VISIT (OUTPATIENT)
Dept: INTERNAL MEDICINE CLINIC | Age: 63
End: 2018-04-05

## 2018-04-05 VITALS
DIASTOLIC BLOOD PRESSURE: 80 MMHG | SYSTOLIC BLOOD PRESSURE: 130 MMHG | WEIGHT: 145 LBS | BODY MASS INDEX: 25.69 KG/M2 | OXYGEN SATURATION: 98 % | HEIGHT: 63 IN | HEART RATE: 76 BPM | TEMPERATURE: 98.8 F

## 2018-04-05 DIAGNOSIS — E66.3 OVERWEIGHT (BMI 25.0-29.9): ICD-10-CM

## 2018-04-05 DIAGNOSIS — H35.30 MACULAR DEGENERATION, UNSPECIFIED LATERALITY, UNSPECIFIED TYPE: ICD-10-CM

## 2018-04-05 DIAGNOSIS — Z00.00 PHYSICAL EXAM: Primary | ICD-10-CM

## 2018-04-05 DIAGNOSIS — M85.80 OSTEOPENIA, UNSPECIFIED LOCATION: ICD-10-CM

## 2018-04-05 DIAGNOSIS — R73.01 IFG (IMPAIRED FASTING GLUCOSE): ICD-10-CM

## 2018-04-05 DIAGNOSIS — M19.90 OSTEOARTHRITIS, UNSPECIFIED OSTEOARTHRITIS TYPE, UNSPECIFIED SITE: ICD-10-CM

## 2018-04-05 DIAGNOSIS — E78.5 HYPERLIPIDEMIA, UNSPECIFIED HYPERLIPIDEMIA TYPE: ICD-10-CM

## 2018-04-05 NOTE — MR AVS SNAPSHOT
[address] [facility], [address]  [address] [facility] 
[phone] Patient: Nadira Sylvester MRN: [id number] Visit Information Date & Time Provider Department Dept. Phone Encounter #  
 4/5/2018  2:00 PM Tano Pink MD [facility] [address] [place] [address] [phone] [id number] Your Appointments 4/2/2019 10:05 AM  
LAB with IOC NURSE VISIT [facility] [address] [place] [address] ([facility]) Appt Note: lab  
 [facility], [address] [facility] [address] [facility]  
  
    
 4/9/2019  1:00 PM  
PHYSICAL with Tano Pink MD  
[facility] [address] [place] [address] Appt Note: rpe rd welcome to medicare [facility], [address] [facility] [address] [facility] Upcoming Health Maintenance Date Due Influenza Age 5 to Adult 8/1/2017 BREAST CANCER SCRN MAMMOGRAM 8/1/2019 DTaP/Tdap/Td series (2 - Td) 6/11/2024 COLONOSCOPY 3/28/2027 Allergies as of 4/5/2018  Review Complete On: 4/5/2018 By: Jacinto Keller Severity Noted Reaction Type Reactions Qsymia [Phentermine-topiramate]  12/06/2013    Other (comments) Insomnia Current Immunizations  Reviewed on 12/11/2014 Name Date Tdap 6/11/2014 Zoster Vaccine, Live 12/11/2014  3:11 PM  
  
 Not reviewed this visit Vitals BP Pulse Temp Height(growth percentile) Weight(growth percentile) SpO2  
 130/80 (BP 1 Location: Left arm, BP Patient Position: Sitting) 76 98.8 °F (37.1 °C) (Oral) 5' 3\" (1.6 m) 145 lb (65.8 kg) 98% BMI OB Status Smoking Status 25.69 kg/m2 Hysterectomy Never Smoker Vitals History BMI and BSA Data Body Mass Index Body Surface Area  
 25.69 kg/m 2 1.71 m 2 Preferred Pharmacy Pharmacy Name Phone [id number]- [facility] [phone] Your Updated Medication List  
  
   
This list is accurate as of 4/5/18  2:36 PM.  Always use your most recent med list.  
  
  
  
  
 atorvastatin 10 mg tablet Commonly known as:  LIPITOR  
TAKE 1 TAB BY MOUTH DAILY. CALCIUM 600 + D 600-125 mg-unit Tab Generic drug:  calcium-cholecalciferol (d3) Take  by mouth.  
  
 clobetasol 0.05 % ointment Commonly known as:  Hertford Luca Apply  to affected area two (2) times a day. DUAVEE PO Take  by mouth. ESTRACE 1 mg tablet Generic drug:  estradiol Take 1 mg by mouth daily. FISH OIL 1,000 mg Cap Generic drug:  omega-3 fatty acids-vitamin e Take 1 Cap by mouth. MULTIVITAMIN PO Take  by mouth. PREMARIN 0.625 mg/gram vaginal cream  
Generic drug:  conjugated estrogens Insert 0.5 g into vagina daily. PRESERVISION AREDS 2 (OMEGA-3) PO Take  by mouth. Introducing [facility] SERVICES! Dear Randalyn Hashimoto: 
Thank you for requesting a PingSome account. Our records indicate that you already have an active PingSome account. You can access your account anytime at [email or web address] Did you know that you can access your hospital and ER discharge instructions at any time in PingSome? You can also review all of your test results from your hospital stay or ER visit. Additional Information If you have questions, please visit the Frequently Asked Questions section of the PingSome website at [email or web address] Remember, PingSome is NOT to be used for urgent needs. For medical emergencies, dial 911. Now available from your iPhone and Android! Please provide this summary of care documentation to your next provider. Your primary care clinician is listed as Cyrus Amezquita. If you have any questions after today's visit, please call [phone].

## 2018-04-05 NOTE — PROGRESS NOTES
1. Have you been to the ER, urgent care clinic or hospitalized since your last visit? NO.     2. Have you seen or consulted any other health care providers outside of the [address] [place] [address] since your last visit (Include any pap smears or colon screening)?  NO

## 2018-06-18 DIAGNOSIS — E78.5 HYPERLIPIDEMIA, UNSPECIFIED HYPERLIPIDEMIA TYPE: ICD-10-CM

## 2018-06-18 RX ORDER — ATORVASTATIN CALCIUM 10 MG/1
10 TABLET, FILM COATED ORAL DAILY
Qty: 90 TAB | Refills: 3 | Status: SHIPPED | OUTPATIENT
Start: 2018-06-18 | End: 2019-06-13 | Stop reason: SDUPTHER

## 2018-06-18 NOTE — TELEPHONE ENCOUNTER
Last Visit: 04/05/2018 with MD Anastasia La    Next Appointment: 04/09/2019 with MD Anastasia La   Previous Refill Encounters: 07/13/2017 per MD Anastasia La #[id number] with 3 refills     Requested Prescriptions     Pending Prescriptions Disp Refills    atorvastatin (LIPITOR) 10 mg tablet 90 Tab 3     Sig: Take 1 Tab by mouth daily.

## 2018-08-07 ENCOUNTER — HOSPITAL ENCOUNTER (OUTPATIENT)
Dept: MAMMOGRAPHY | Age: 63
Discharge: HOME | End: 2018-08-07
Attending: INTERNAL MEDICINE
Payer: COMMERCIAL

## 2018-08-07 DIAGNOSIS — Z12.31 VISIT FOR SCREENING MAMMOGRAM: ICD-10-CM

## 2018-08-07 PROCEDURE — 77063 BREAST TOMOSYNTHESIS BI: CPT

## 2019-04-02 ENCOUNTER — LAB ONLY (OUTPATIENT)
Dept: INTERNAL MEDICINE CLINIC | Age: 64
End: 2019-04-02

## 2019-04-02 ENCOUNTER — HOSPITAL ENCOUNTER (OUTPATIENT)
Dept: LAB | Age: 64
Discharge: HOME | End: 2019-04-02
Payer: MEDICARE

## 2019-04-02 DIAGNOSIS — M85.80 OSTEOPENIA, UNSPECIFIED LOCATION: ICD-10-CM

## 2019-04-02 DIAGNOSIS — E55.9 VITAMIN D DEFICIENCY: ICD-10-CM

## 2019-04-02 DIAGNOSIS — Z00.00 PHYSICAL EXAM: ICD-10-CM

## 2019-04-02 DIAGNOSIS — E78.5 HYPERLIPIDEMIA, UNSPECIFIED HYPERLIPIDEMIA TYPE: ICD-10-CM

## 2019-04-02 DIAGNOSIS — E78.5 HYPERLIPIDEMIA, UNSPECIFIED HYPERLIPIDEMIA TYPE: Primary | ICD-10-CM

## 2019-04-02 LAB
ALBUMIN SERPL-MCNC: 3.9 G/DL (ref 3.4–5)
ALBUMIN/GLOB SERPL: 1.2 {RATIO} (ref 0.8–1.7)
ALP SERPL-CCNC: 84 U/L (ref 45–117)
ALT SERPL-CCNC: 26 U/L (ref 13–56)
ANION GAP SERPL CALC-SCNC: 3 MMOL/L (ref 3–18)
AST SERPL-CCNC: 18 U/L (ref 15–37)
BASOPHILS # BLD: 0.1 K/UL (ref 0–0.1)
BASOPHILS NFR BLD: 1 % (ref 0–2)
BILIRUB SERPL-MCNC: 1 MG/DL (ref 0.2–1)
BUN SERPL-MCNC: 14 MG/DL (ref 7–18)
BUN/CREAT SERPL: 18 (ref 12–20)
CALCIUM SERPL-MCNC: 8.6 MG/DL (ref 8.5–10.1)
CHLORIDE SERPL-SCNC: 105 MMOL/L (ref 100–108)
CHOLEST SERPL-MCNC: 167 MG/DL
CO2 SERPL-SCNC: 32 MMOL/L (ref 21–32)
CREAT SERPL-MCNC: 0.79 MG/DL (ref 0.6–1.3)
DIFFERENTIAL METHOD BLD: NORMAL
EOSINOPHIL # BLD: 0.3 K/UL (ref 0–0.4)
EOSINOPHIL NFR BLD: 5 % (ref 0–5)
ERYTHROCYTE [DISTWIDTH] IN BLOOD BY AUTOMATED COUNT: 13.4 % (ref 11.6–14.5)
GLOBULIN SER CALC-MCNC: 3.3 G/DL (ref 2–4)
GLUCOSE SERPL-MCNC: 87 MG/DL (ref 74–99)
HCT VFR BLD AUTO: 41 % (ref 35–45)
HDLC SERPL-MCNC: 67 MG/DL (ref 40–60)
HDLC SERPL: 2.5 {RATIO} (ref 0–5)
HGB BLD-MCNC: 12.9 G/DL (ref 12–16)
LDLC SERPL CALC-MCNC: 70.4 MG/DL (ref 0–100)
LIPID PROFILE,FLP: ABNORMAL
LYMPHOCYTES # BLD: 1.6 K/UL (ref 0.9–3.6)
LYMPHOCYTES NFR BLD: 27 % (ref 21–52)
MCH RBC QN AUTO: 29.5 PG (ref 24–34)
MCHC RBC AUTO-ENTMCNC: 31.5 G/DL (ref 31–37)
MCV RBC AUTO: 93.8 FL (ref 74–97)
MONOCYTES # BLD: 0.4 K/UL (ref 0.05–1.2)
MONOCYTES NFR BLD: 7 % (ref 3–10)
NEUTS SEG # BLD: 3.6 K/UL (ref 1.8–8)
NEUTS SEG NFR BLD: 60 % (ref 40–73)
PLATELET # BLD AUTO: 275 K/UL (ref 135–420)
PMV BLD AUTO: 10.4 FL (ref 9.2–11.8)
POTASSIUM SERPL-SCNC: 4.1 MMOL/L (ref 3.5–5.5)
PROT SERPL-MCNC: 7.2 G/DL (ref 6.4–8.2)
RBC # BLD AUTO: 4.37 M/UL (ref 4.2–5.3)
SODIUM SERPL-SCNC: 140 MMOL/L (ref 136–145)
TRIGL SERPL-MCNC: 148 MG/DL (ref ?–150)
VLDLC SERPL CALC-MCNC: 29.6 MG/DL
WBC # BLD AUTO: 5.9 K/UL (ref 4.6–13.2)

## 2019-04-02 PROCEDURE — 85025 COMPLETE CBC W/AUTO DIFF WBC: CPT

## 2019-04-02 PROCEDURE — 36415 COLL VENOUS BLD VENIPUNCTURE: CPT

## 2019-04-02 PROCEDURE — 80053 COMPREHEN METABOLIC PANEL: CPT

## 2019-04-02 PROCEDURE — 80061 LIPID PANEL: CPT

## 2019-04-02 PROCEDURE — 82306 VITAMIN D 25 HYDROXY: CPT

## 2019-04-03 LAB — 25(OH)D3 SERPL-MCNC: 40.6 NG/ML (ref 30–100)

## 2019-04-04 NOTE — PROGRESS NOTES
70 y.o. white female who presents for reevaluation She seems to be doing ok. Denies any depression sx No cardiovascular complaints, she's not checking her bp. She started shagging 3x/week sometimes up to 2 hours Denies polyuria, polydipsia, nocturia, vision change. Not checking sugars at this time. Weight is stable Vitals 4/9/2019 4/5/2018 10/6/2017 7/13/2017 12/19/2016 Weight 145 lb 145 lb 141 lb 145 lb 8 oz 145 lb Denies GI or Gu complaints. She is requesting macrobid as she has tendency for uti sx whenever she travels She reports tightness in the left shoulder, no injury, radicular complaints The allergies have been flaring but she's using antihistamines LAST MEDICARE WELLNESS EXAM: 4/9/19 Past Medical History:  
Diagnosis Date  Colon polyp 03/2017 Dr Azalia Cha  DJD (degenerative joint disease)  FHx: heart disease  Hyperlipidemia   
 calc risk score 1.9 from (1/12); taking statins due to strong FH  
 IFG (impaired fasting glucose) 5/12  Macular degeneration Dr. Bolton Knows  Osteopenia DEXA t score spine 3.5, hip -2.0 (5/12); 0.9 spine, -2.0 hip FRAX 10/1.4 (8/14);  4.5 spine, -2.1 hip (10/16) Dr Shannon Ambriz; Fosamax [phone]  Overweight (BMI 25.0-29. 9) 4/5/2018  Right sided sciatica 03/2016 Dr Victoria Guzmán in [facility]; Dr Karlie Posada; Holly Bucco 7/16 showed severe multilvevel degen change, large right L4-5 synovial cyst w impingment of R L5 with moderate to severe central stenosis, severe L3-4, mod L2-3 central stenosis and L2-3 left, L3-4bilat, L4-5 left foraminal stenosis  Zoster 1980s Past Surgical History:  
Procedure Laterality Date  HX BLADDER SUSPENSION  4/2006 Dr. Shannon Ambriz  HX COLONOSCOPY Dr Azalia Cha 2006 hemorrhoids; 3/17 polyp [address]  HX HYSTERECTOMY  2001  
 NEUROLOGICAL PROCEDURE UNLISTED  11/2016  
 partial right L4-5 laminectomy for resection of large synovial cyst Dr Karlie Posada Social History Socioeconomic History  Marital status:  Spouse name: Not on file  Number of children: 1  Years of education: Not on file  Highest education level: Not on file Occupational History  Occupation:  Social Needs  Financial resource strain: Not on file  Food insecurity:  
  Worry: Not on file Inability: Not on file  Transportation needs:  
  Medical: Not on file Non-medical: Not on file Tobacco Use  Smoking status: Never Smoker  Smokeless tobacco: Never Used Substance and Sexual Activity  Alcohol use: Yes Alcohol/week: 0.0 oz  
  Comment: social  
 Drug use: No  
 Sexual activity: Not on file Lifestyle  Physical activity:  
  Days per week: Not on file Minutes per session: Not on file  Stress: Not on file Relationships  Social connections:  
  Talks on phone: Not on file Gets together: Not on file Attends [employer] service: Not on file Active member of club or organization: Not on file Attends meetings of clubs or organizations: Not on file Relationship status: Not on file  Intimate partner violence:  
  Fear of current or ex partner: Not on file Emotionally abused: Not on file Physically abused: Not on file Forced sexual activity: Not on file Other Topics Concern  Not on file Social History Narrative  Not on file Current Outpatient Medications Medication Sig  
 atorvastatin (LIPITOR) 10 mg tablet Take 1 Tab by mouth daily.  ANTIOX #8/OM3/DHA/EPA/LUT/ZEAX (PRESERVISION AREDS 2, OMEGA-3, PO) Take  by mouth.  conjugated estrogens (PREMARIN) 0.625 mg/gram vaginal cream Insert 0.5 g into vagina daily.  clobetasol (TEMOVATE) 0.05 % ointment Apply  to affected area two (2) times a day.  calcium-cholecalciferol, d3, (CALCIUM 600 + D) 600-125 mg-unit Tab Take  by mouth.  MULTIVITAMIN PO Take  by mouth.  ESTROGENS,CONJ/BAZEDOXIFENE (DUAVEE PO) Take  by mouth. No current facility-administered medications for this visit. Allergies Allergen Reactions  Qsymia [Phentermine-Topiramate] Other (comments) Insomnia REVIEW OF SYSTEMS:  Dr. Loren Oates 10/17, mammo 8/18, DEXA 8/17, colo 3/17 Dr Patton  Ophtho  no vision change or eye pain Oral  no mouth pain, tongue or tooth problems Ears  no hearing loss, ear pain, fullness, no swallowing problems Cardiac  no CP, PND, orthopnea, edema, palpitations or syncope Chest  no breast masses Resp  no wheezing, chronic coughing, dyspnea GI  no heartburn, nausea, vomiting, change in bowel habits, bleeding, hemorrhoids Urinary  no dysuria, hematuria, flank pain, urgency, frequency Constitutional  no wt loss, night sweats, unexplained fevers Neuro  no focal weakness, numbness, paresthesias, incoordination, ataxia, involuntary movements Endo - no polyuria, polydipsia, nocturia, hot flashes Visit Vitals /82 Pulse 78 Temp 98.3 °F (36.8 °C) (Oral) Resp 14 Ht 5' 3\" (1.6 m) Wt 145 lb (65.8 kg) SpO2 98% BMI 25.69 kg/m² A&O x3 Affect is appropriate. Mood stable No apparent distress Anicteric, no JVD, adenopathy or thyromegaly. No carotid bruits or radiated murmur Lungs clear to auscultation, no wheezes or rales Heart showed regular rate and rhythm. No murmur, rubs, gallops Abdomen soft nontender, no hepatosplenomegaly or masses. Extremities without edema. Pulses 1-2+ symmetrically 
rom bilat shoulders intact although slightly dec abduction left shoulder; no ac joint, bicipital groove or subacromial tenderness. Strength, sensation, pulses normal BUE 
 
LABS From 1/12 showed   gluc 94,   cr 0.74, gfr 90,  alt 17,                   ldl-p 2079, chol 246, tg 154, hdl 53, ldl-c 162, wbc 5.0, hb 12.4, plt 275 From 5/12 showed   gluc 106, cr 0.82                         hba1c 5.8, ldl-p 1162, chol 159, tg 150, hdl 49, ldl-c 80,         tsh 1.35 From 11/12 showed gluc 100, cr 0.89                         hba1c 5.8, ldl-p 976,   chol 152, tg 167, hdl 49, ldl-c 70 From 5/13 showed                   hba1c 5.8,                   chol 165, tg 111, hdl 54, ldl-c 89 From 12/13 showed gluc 99,   cr 0.92, gfr 68,  alt 12, hba1c 5.7,               chol 155, tg 115, hdl 51, ldl-c 81 From 6/14 showed   gluc 92,   cr 1.03, gfr 55,  alt 13, hba1c 5.6,     chol 161, tg 98,   hdl 54, ldl-c 87,  wbc 5.6, hb 13.6, plt 257, tsh 1.09 From 12/14 showed        hba1c 5.8,     chol 158, tg 108, hdl 64, ldl-c 72 From 6/15 showed   gluc 98,   cr 0.89, gfr>60, alt 10, hba1c 5.9,     chol 151, tg 81,   hdl 56, ldl-c 79 From 12/15 showed        hba1c 5.6,     chol 161, tg 141, hdl 68, ldl-c 65 From 6/16 showed   gluc 99,   cr 0.88, gfr>60,   hba1c 5.8,               wbc 5.6, hb 12.9, plt 252, hep c neg From 11/16 showed gluc 93,   cr 0.75, gfr>60,                 wbc 6.1, hb 12.1, plt 247 From 12/16 showed        hba1c 5.0,     chol 181, tg 119, hdl 76, ldl-c 81 From 7/17 showed        hba1c 5.5,     chol 173, tg 203, hdl 60, ldl-c 72 From 3/18 showed   gluc 96,   cr 0.80, gfr>60, alt 23,                wbc 6.4, hb 13.3, plt 251 Results for orders placed or performed during the hospital encounter of 04/02/19 CBC WITH AUTOMATED DIFF Result Value Ref Range WBC 5.9 4.6 - 13.2 K/uL  
 RBC 4.37 4.20 - 5.30 M/uL  
 HGB 12.9 12.0 - 16.0 g/dL HCT 41.0 35.0 - 45.0 % MCV 93.8 74.0 - 97.0 FL  
 MCH 29.5 24.0 - 34.0 PG  
 MCHC 31.5 31.0 - 37.0 g/dL  
 RDW 13.4 11.6 - 14.5 % PLATELET 448 628 - 193 K/uL MPV 10.4 9.2 - 11.8 FL  
 NEUTROPHILS 60 40 - 73 % LYMPHOCYTES 27 21 - 52 % MONOCYTES 7 3 - 10 % EOSINOPHILS 5 0 - 5 % BASOPHILS 1 0 - 2 %  
 ABS. NEUTROPHILS 3.6 1.8 - 8.0 K/UL  
 ABS. LYMPHOCYTES 1.6 0.9 - 3.6 K/UL  
 ABS. MONOCYTES 0.4 0.05 - 1.2 K/UL ABS. EOSINOPHILS 0.3 0.0 - 0.4 K/UL  
 ABS. BASOPHILS 0.1 0.0 - 0.1 K/UL  
 DF AUTOMATED    
LIPID PANEL Result Value Ref Range LIPID PROFILE Cholesterol, total 167 <200 MG/DL Triglyceride 148 <150 MG/DL  
 HDL Cholesterol 67 (H) 40 - 60 MG/DL  
 LDL, calculated 70.4 0 - 100 MG/DL VLDL, calculated 29.6 MG/DL  
 CHOL/HDL Ratio 2.5 0 - 5.0 METABOLIC PANEL, COMPREHENSIVE Result Value Ref Range Sodium 140 136 - 145 mmol/L Potassium 4.1 3.5 - 5.5 mmol/L Chloride 105 100 - 108 mmol/L  
 CO2 32 21 - 32 mmol/L Anion gap 3 3.0 - 18 mmol/L Glucose 87 74 - 99 mg/dL BUN 14 7.0 - 18 MG/DL Creatinine 0.79 0.6 - 1.3 MG/DL  
 BUN/Creatinine ratio 18 12 - 20 GFR est AA >60 >60 ml/min/1.73m2 GFR est non-AA >60 >60 ml/min/1.73m2 Calcium 8.6 8.5 - 10.1 MG/DL Bilirubin, total 1.0 0.2 - 1.0 MG/DL  
 ALT (SGPT) 26 13 - 56 U/L  
 AST (SGOT) 18 15 - 37 U/L Alk. phosphatase 84 45 - 117 U/L Protein, total 7.2 6.4 - 8.2 g/dL Albumin 3.9 3.4 - 5.0 g/dL Globulin 3.3 2.0 - 4.0 g/dL A-G Ratio 1.2 0.8 - 1.7 VITAMIN D, 25 HYDROXY Result Value Ref Range Vitamin D 25-Hydroxy 40.6 30 - 100 ng/mL Patient Active Problem List  
Diagnosis Code  Osteopenia FRAX 8/14 10 and 1.4 M85.80  Hyperlipidemia E78.5  Macular degeneration Dr. Chen Pod H35.30  
 IFG (impaired fasting glucose) R73.01  
 Arthritis, degenerative M19.90  GERD without esophagitis K21.9  Advance directive in chart Z78.9  Overweight (BMI 25.0-29. 9) E66.3 ASSESSMENT AND PLAN 1.  DJD. Prn nsaids 2. AMD.  F/U Dr. Chen Pod 3. GERD. Prn PPI and avoidance measures 4. Osteopenia. Ca/D/wt bearing exercises. 5.  Hyperlipidemia and strong FH chd.  Continue lipitor 6. IFG. Weight loss, continue dietary and lifestyle measures 7. Colon polyp. Fiber, colo 2022 Dr Natalie Varma 8. Overweight. Lifestyle and dietary measures. Portion control reiterated. RTC 4/20 Above conditions discussed at length and patient vocalized understanding. All questions answered to patient satisfaction

## 2019-04-09 ENCOUNTER — OFFICE VISIT (OUTPATIENT)
Dept: INTERNAL MEDICINE CLINIC | Age: 64
End: 2019-04-09

## 2019-04-09 VITALS
TEMPERATURE: 98.3 F | OXYGEN SATURATION: 98 % | HEIGHT: 63 IN | BODY MASS INDEX: 25.69 KG/M2 | DIASTOLIC BLOOD PRESSURE: 82 MMHG | RESPIRATION RATE: 14 BRPM | WEIGHT: 145 LBS | SYSTOLIC BLOOD PRESSURE: 126 MMHG | HEART RATE: 78 BPM

## 2019-04-09 DIAGNOSIS — Z13.6 SCREENING FOR ISCHEMIC HEART DISEASE: ICD-10-CM

## 2019-04-09 DIAGNOSIS — Z12.11 SCREEN FOR COLON CANCER: ICD-10-CM

## 2019-04-09 DIAGNOSIS — Z13.39 SCREENING FOR ALCOHOLISM: ICD-10-CM

## 2019-04-09 DIAGNOSIS — K21.9 GERD WITHOUT ESOPHAGITIS: ICD-10-CM

## 2019-04-09 DIAGNOSIS — Z71.89 ADVANCED DIRECTIVES, COUNSELING/DISCUSSION: ICD-10-CM

## 2019-04-09 DIAGNOSIS — Z00.00 WELCOME TO MEDICARE PREVENTIVE VISIT: Primary | ICD-10-CM

## 2019-04-09 DIAGNOSIS — E66.3 OVERWEIGHT (BMI 25.0-29.9): ICD-10-CM

## 2019-04-09 DIAGNOSIS — N30.00 ACUTE CYSTITIS WITHOUT HEMATURIA: ICD-10-CM

## 2019-04-09 DIAGNOSIS — R73.01 IFG (IMPAIRED FASTING GLUCOSE): ICD-10-CM

## 2019-04-09 DIAGNOSIS — Z13.1 SCREENING FOR DIABETES MELLITUS: ICD-10-CM

## 2019-04-09 DIAGNOSIS — Z13.31 SCREENING FOR DEPRESSION: ICD-10-CM

## 2019-04-09 DIAGNOSIS — M15.9 PRIMARY OSTEOARTHRITIS INVOLVING MULTIPLE JOINTS: ICD-10-CM

## 2019-04-09 DIAGNOSIS — M85.89 OSTEOPENIA OF MULTIPLE SITES: ICD-10-CM

## 2019-04-09 DIAGNOSIS — Z23 ENCOUNTER FOR IMMUNIZATION: ICD-10-CM

## 2019-04-09 DIAGNOSIS — E78.5 HYPERLIPIDEMIA, UNSPECIFIED HYPERLIPIDEMIA TYPE: ICD-10-CM

## 2019-04-09 RX ORDER — NITROFURANTOIN (MACROCRYSTALS) 100 MG/1
100 CAPSULE ORAL 2 TIMES DAILY
Qty: 14 CAP | Refills: 0 | Status: SHIPPED | OUTPATIENT
Start: 2019-04-09 | End: 2019-04-16

## 2019-04-09 NOTE — PATIENT INSTRUCTIONS
Vaccine Information Statement Pneumococcal Conjugate Vaccine (PCV13): What You Need to Know Many Vaccine Information Statements are available in [place] and other languages. See www.immunize.org/vis. Hojas de información Sobre Vacunas están disponibles en español y en muchos otros idiomas. Visite www.immunize.org/vis. 1. Why get vaccinated? Vaccination can protect both children and adults from pneumococcal disease. Pneumococcal disease is caused by bacteria that can spread from person to person through close contact. It can cause ear infections, and it can also lead to more serious infections of the: 
 Lungs (pneumonia),  Blood (bacteremia), and 
 Covering of the brain and spinal cord (meningitis). Pneumococcal pneumonia is most common among adults. Pneumococcal meningitis can cause deafness and brain damage, and it kills about 1 child in 10 who get it. Anyone can get pneumococcal disease, but children under 3years of age and adults 72 years and older, people with certain medical conditions, and cigarette smokers are at the highest risk. Before there was a vaccine, the [facility] saw: 
 more than 700 cases of meningitis, 
 about 13,000 blood infections, 
 about 5 million ear infections, and 
 about 200 deaths 
in children under 5 each year from pneumococcal disease. Since vaccine became available, severe pneumococcal disease in these children has fallen by 88%. About 18,000 older adults die of pneumococcal disease each year in the [place]. Treatment of pneumococcal infections with penicillin and other drugs is not as effective as it used to be, because some strains of the disease have become resistant to these drugs. This makes prevention of the disease, through vaccination, even more important. 2. PCV13 vaccine Pneumococcal conjugate vaccine (called PCV13) protects against 13 types of pneumococcal bacteria. PCV13 is routinely given to children at 2, 4, 6, and 1515 months of age. It is also recommended for children and adults 3to 59years of age with certain health conditions, and for all adults 72years of age and older. Your doctor can give you details. 3. Some people should not get this vaccine Anyone who has ever had a life-threatening allergic reaction to a dose of this vaccine, to an earlier pneumococcal vaccine called PCV7, or to any vaccine containing diphtheria toxoid (for example, DTaP), should not get PCV13. Anyone with a severe allergy to any component of PCV13 should not get the vaccine. Tell your doctor if the person being vaccinated has any severe allergies. If the person scheduled for vaccination is not feeling well, your healthcare provider might decide to reschedule the shot on another day. 4. Risks of a vaccine reaction With any medicine, including vaccines, there is a chance of reactions. These are usually mild and go away on their own, but serious reactions are also possible. Problems reported following PCV13 varied by age and dose in the series. The most common problems reported among children were:  About half became drowsy after the shot, had a temporary loss of appetite, or had redness or tenderness where the shot was given.  About 1 out of 3 had swelling where the shot was given.  About 1 out of 3 had a mild fever, and about 1 in 20 had a fever over 102.2°F. 
 Up to about 8 out of 10 became fussy or irritable. Adults have reported pain, redness, and swelling where the shot was given; also mild fever, fatigue, headache, chills, or muscle pain. Claudy Patel children who get PCV13 along with inactivated flu vaccine at the same time may be at increased risk for seizures caused by fever. Ask your doctor for more information. Problems that could happen after any vaccine:  People sometimes faint after a medical procedure, including vaccination. Sitting or lying down for about 15 minutes can help prevent fainting, and injuries caused by a fall. Tell your doctor if you feel dizzy, or have vision changes or ringing in the ears.  Some older children and adults get severe pain in the shoulder and have difficulty moving the arm where a shot was given. This happens very rarely.  Any medication can cause a severe allergic reaction. Such reactions from a vaccine are very rare, estimated at about 1 in a million doses, and would happen within a few minutes to a few hours after the vaccination. As with any medicine, there is a very small chance of a vaccine causing a serious injury or death. The safety of vaccines is always being monitored. For more information, visit: www.cdc.gov/vaccinesafety/  
 
5. What if there is a serious reaction? What should I look for?  Look for anything that concerns you, such as signs of a severe allergic reaction, very high fever, or unusual behavior. Signs of a severe allergic reaction can include hives, swelling of the face and throat, difficulty breathing, a fast heartbeat, dizziness, and weakness  usually within a few minutes to a few hours after the vaccination. What should I do?  If you think it is a severe allergic reaction or other emergency that cant wait, call 9-1-1 or get the person to the nearest hospital. Otherwise, call your doctor. Reactions should be reported to the Vaccine Adverse Event Reporting System (VAERS). Your doctor should file this report, or you can do it yourself through the VAERS web site at www.vaers. hhs.gov, or by calling 3-464.997.1713. VAERS does not give medical advice. 6. The National Vaccine Injury Compensation Program 
 
The [facility] Vaccine Injury Compensation Program (VICP) is a federal program that was created to compensate people who may have been injured by certain vaccines.  
 
Persons who believe they may have been injured by a vaccine can learn about the program and about filing a claim by calling 9-590.702.2207 or visiting the 1900 "[employer] website at www.[facility].gov/vaccinecompensation. There is a time limit to file a claim for compensation. 7. How can I learn more?  Ask your healthcare provider. He or she can give you the vaccine package insert or suggest other sources of information.  Call your local or state health department.  Contact the Centers for Disease Control and Prevention (CDC): 
- Call 1-434.722.7953 (1-800-CDC-INFO) or 
- Visit CDCs website at www.cdc.gov/vaccines Vaccine Information Statement PCV13 Vaccine 11/5/2015  
42 JEANNE Barbour 118JN-90 Department of Health and [employer] Centers for Disease Control and Prevention Office Use Only Medicare Wellness Visit, Female The best way to live healthy is to have a lifestyle where you eat a well-balanced diet, exercise regularly, limit alcohol use, and quit all forms of tobacco/nicotine, if applicable. Regular preventive services are another way to keep healthy. Preventive services (vaccines, screening tests, monitoring & exams) can help personalize your care plan, which helps you manage your own care. Screening tests can find health problems at the earliest stages, when they are easiest to treat. [facility] follows the current, evidence-based guidelines published by the [place] Nader Yogi (USPSTF) when recommending preventive services for our patients. Because we follow these guidelines, sometimes recommendations change over time as research supports it. (For example, mammograms used to be recommended annually. Even though Medicare will still pay for an annual mammogram, the newer guidelines recommend a mammogram every two years for women of average risk.) Of course, you and your doctor may decide to screen more often for some diseases, based on your risk and your health status. Preventive services for you include: - Medicare offers their members a free annual wellness visit, which is time for you and your primary care provider to discuss and plan for your preventive service needs. Take advantage of this benefit every year! 
-All adults over the age of 72 should receive the recommended pneumonia vaccines. Current USPSTF guidelines recommend a series of two vaccines for the best pneumonia protection.  
-All adults should have a flu vaccine yearly and a tetanus vaccine every 10 years. All adults age 61 and older should receive a shingles vaccine once in their lifetime.   
-A bone mass density test is recommended when a woman turns 65 to screen for osteoporosis. This test is only recommended one time, as a screening. Some providers will use this same test as a disease monitoring tool if you already have osteoporosis. -All adults age 38-68 who are overweight should have a diabetes screening test once every three years.  
-Other screening tests and preventive services for persons with diabetes include: an eye exam to screen for diabetic retinopathy, a kidney function test, a foot exam, and stricter control over your cholesterol.  
-Cardiovascular screening for adults with routine risk involves an electrocardiogram (ECG) at intervals determined by your doctor.  
-Colorectal cancer screenings should be done for adults age 54-65 with no increased risk factors for colorectal cancer. There are a number of acceptable methods of screening for this type of cancer. Each test has its own benefits and drawbacks. Discuss with your doctor what is most appropriate for you during your annual wellness visit. The different tests include: colonoscopy (considered the best screening method), a fecal occult blood test, a fecal DNA test, and sigmoidoscopy. -Breast cancer screenings are recommended every other year for women of normal risk, age 54-69. 
-Cervical cancer screenings for women over age 72 are only recommended with certain risk factors. -All adults born between [facility] should be screened once for Hepatitis C. Here is a list of your current Health Maintenance items (your personalized list of preventive services) with a due date: 
Health Maintenance Due Topic Date Due  Shingles Vaccine (1 of 2) 10/23/2003  Glaucoma Screening   10/23/2018 [facility] Annual Well Visit  04/02/2019

## 2019-04-09 NOTE — PROGRESS NOTES
Mehrankolby Mclaughlin 1955 female who presents for routine immunizations. Patient denies any symptoms , reactions or allergies that would exclude them from being immunized today. Risks and adverse reactions were discussed and the VIS was given to them. All questions were addressed. Order placed for PCV13,  per Verbal Order from DR. Avila Foot with read back. Patient was observed for 15 min post injection. There were no reactions observed.  
 
Yolanda Ibanez LPN

## 2019-04-09 NOTE — PROGRESS NOTES
Chief Complaint Patient presents with  Welcome To Medicare  Cholesterol Problem  
  with labs 1. Have you been to the ER, urgent care clinic or hospitalized since your last visit? NO.  
 
2. Have you seen or consulted any other health care providers outside of the [address] [place] [address] since your last visit (Include any pap smears or colon screening)?  NO

## 2019-04-10 NOTE — ACP (ADVANCE CARE PLANNING)
Advance Care Planning    Advance Care Planning (ACP) Provider Note - Comprehensive     Date of ACP Conversation: 04/09/19  Persons included in Conversation:  patient  Length of ACP Conversation in minutes:  16 minutes    Authorized Decision Maker (if patient is incapable of making informed decisions): This person is: RANJANA Scanlon [facility] Agent/Medical Power of  under Advance Directive          General ACP for ALL Patients with Decision Making Capacity:   Importance of advance care planning, including choosing a healthcare agent to communicate patient's healthcare decisions if patient lost the ability to make decisions, such as after a sudden illness or accident  Understanding of the healthcare agent role was assessed and information provided    Review of Existing Advance Directive:  Does this advance directive still reflect your preferences? No: she will change poa to [employer] (Provide new form/Refer for assistance in updating)    For Serious or Chronic Illness:  Understanding of medical condition    Understanding of CPR, goals and expected outcomes, benefits and burdens discussed.     Interventions Provided:  Recommended completion of Advance Directive form after review of ACP materials and conversation with prospective healthcare agent   Recommended communicating the plan and making copies for the healthcare agent, personal physician, and others as appropriate (e.g., health system)  Recommended review of completed ACP document annually or upon change in health status

## 2019-04-10 NOTE — PROGRESS NOTES
This is a welcome to Medicare Annual Wellness Exam 
 
I have reviewed the patient's medical history in detail and updated the computerized patient record. History Past Medical History:  
Diagnosis Date  Colon polyp 03/2017 Dr Cara Brooks  DJD (degenerative joint disease)  FHx: heart disease  Hyperlipidemia   
 calc risk score 1.9 from (1/12); taking statins due to strong FH  
 IFG (impaired fasting glucose) 5/12  Macular degeneration Dr. Beryl Benson  Osteopenia DEXA t score spine 3.5, hip -2.0 (5/12); 0.9 spine, -2.0 hip FRAX 10/1.4 (8/14);  4.5 spine, -2.1 hip (10/16) Dr Winston Farias; Fosamax [id number]  Overweight (BMI 25.0-29. 9) 4/5/2018  Right sided sciatica 03/2016 Dr Hyacinth Sykes in [facility]; Dr Shonna Sheppard; Ira Mantel 7/16 showed severe multilvevel degen change, large right L4-5 synovial cyst w impingment of R L5 with moderate to severe central stenosis, severe L3-4, mod L2-3 central stenosis and L2-3 left, L3-4bilat, L4-5 left foraminal stenosis  Zoster 1980s Past Surgical History:  
Procedure Laterality Date  HX BLADDER SUSPENSION  4/2006 Dr. Winston Farias  HX COLONOSCOPY Dr Cara Brooks 2006 hemorrhoids; 3/17 polyp [facility]  HX HYSTERECTOMY  2001  
 NEUROLOGICAL PROCEDURE UNLISTED  11/2016  
 partial right L4-5 laminectomy for resection of large synovial cyst Dr Shonna Sheppard Current Outpatient Medications Medication Sig Dispense Refill  nitrofurantoin (MACRODANTIN) 100 mg capsule Take 1 Cap by mouth two (2) times a day for 7 days. 14 Cap 0  
 atorvastatin (LIPITOR) 10 mg tablet Take 1 Tab by mouth daily. 90 Tab 3  ANTIOX #8/OM3/DHA/EPA/LUT/ZEAX (PRESERVISION AREDS 2, OMEGA-3, PO) Take  by mouth.  conjugated estrogens (PREMARIN) 0.625 mg/gram vaginal cream Insert 0.5 g into vagina daily.  clobetasol (TEMOVATE) 0.05 % ointment Apply  to affected area two (2) times a day.  calcium-cholecalciferol, d3, (CALCIUM 600 + D) 600-125 mg-unit Tab Take  by mouth.  MULTIVITAMIN PO Take  by mouth.  ESTROGENS,CONJ/BAZEDOXIFENE (DUAVEE PO) Take  by mouth. Allergies Allergen Reactions  Qsymia [Phentermine-Topiramate] Other (comments) Insomnia Family History Problem Relation Age of Onset  Heart Disease Mother  Other Father ESRD  Heart Disease Father  Heart Disease Brother  Elevated Lipids Brother  Diabetes Brother  Cancer Maternal Aunt 54  
     breast  
 Diabetes Other   
     maternal cousin breast  
 
Social History Tobacco Use  Smoking status: Never Smoker  Smokeless tobacco: Never Used Substance Use Topics  Alcohol use: Yes Alcohol/week: 0.0 oz  
  Comment: social  
 
Depression Risk Factor Screening:  
 
3 most recent PHQ Screens 4/9/2019 Little interest or pleasure in doing things Not at all Feeling down, depressed, irritable, or hopeless Not at all Total Score PHQ 2 0 SCREENINGS Colonoscopy last done 3/17 Mammogram last done 8/18 DEXA last done 10/16 Gyn last done >5 yrs Immunization History Administered Date(s) Administered  Influenza High Dose Vaccine PF 10/29/2018  Influenza Vaccine 10/01/2017  Pneumococcal Conjugate (PCV-13) 04/09/2019  Tdap 06/11/2014  Zoster Vaccine, Live 12/11/2014 Alcohol Risk Factor Screening: You do not drink alcohol or very rarely. Functional Ability and Level of Safety:  
Hearing Loss Hearing is good. Activities of Daily Living The home contains: no safety equipment. Patient does total self care Fall Risk Fall Risk Assessment, last 12 mths 4/9/2019 Able to walk? Yes Fall in past 12 months? No  
 
 
Abuse Screen Patient is not abused Cognitive Screening Evaluation of Cognitive Function: 
Has your family/caregiver stated any concerns about your memory: no 
Normal 
 
Patient Care Team  
Patient Care Team: Kris Chatterjee MD as PCP - General (Internal Medicine) Lane Golden RN as Ambulatory Care Navigator (Internal Medicine) Génesis Thurman MD as Surgeon (Surgical Oncology) Assessment/Plan Education and counseling provided: 
Are appropriate based on today's review and evaluation End-of-Life planning (with patient's consent) Pneumococcal Vaccine Influenza Vaccine Screening Mammography Colorectal cancer screening tests Cardiovascular screening blood test 
Diabetes screening test 
 
Diagnoses and all orders for this visit: 
 
1. Welcome to Medicare preventive visit 2. Hyperlipidemia, unspecified hyperlipidemia type 
-     CBC W/O DIFF; Future -     LIPID PANEL; Future -     METABOLIC PANEL, COMPREHENSIVE; Future 3. Encounter for immunization 
-     PNEUMOCOCCAL CONJ VACCINE 13 VALENT IM 
-     ADMIN PNEUMOCOCCAL VACCINE 
 
4. IFG (impaired fasting glucose) 
-     HEMOGLOBIN A1C W/O EAG; Future 5. Overweight (BMI 25.0-29.9) 6. Primary osteoarthritis involving multiple joints 7. GERD without esophagitis 8. Osteopenia of multiple sites 9. Screening for alcoholism -     [place] ANNUAL ALCOHOL SCREEN 15 MIN 10. Advanced directives, counseling/discussion -     ADVANCE CARE PLANNING FIRST 30 MINS 11. Screening for depression 
-     [address] 12. Screen for colon cancer 13. Screening for diabetes mellitus 14. Screening for ischemic heart disease 15. Acute cystitis without hematuria 
-     nitrofurantoin (MACRODANTIN) 100 mg capsule; Take 1 Cap by mouth two (2) times a day for 7 days. Health Maintenance Due Topic Date Due  Shingrix Vaccine Age 50> (1 of 2) 10/23/2003  GLAUCOMA SCREENING Q2Y  10/23/2018  MEDICARE YEARLY EXAM  04/02/2019 She will get shingrix done p13 given; p23 next year

## 2019-05-12 NOTE — PROGRESS NOTES
70 y.o. white female who presents for reevaluation She is here for med clearance prior to planned right cataract surgery by Dr Charles Pollock No cardiovascular complaints, she's not checking her bp. She continues to shag 3x/week sometimes up to 2 hours Vitals 5/13/2019 5/13/2019 4/9/2019 4/5/2018 10/6/2017 Blood Pressure 140/98 148/80 126/82 130/80 150/86 Vitals 7/13/2017 12/19/2016 Blood Pressure 134/82 148/76 Denies polyuria, polydipsia, nocturia, vision change. Not checking sugars at this time. Weight is stable Denies GI or Gu complaints. LAST MEDICARE WELLNESS EXAM: 4/9/19 Past Medical History:  
Diagnosis Date  Colon polyp 03/2017 Dr Beverly FIELDSD (degenerative joint disease)  FHx: heart disease  Hyperlipidemia   
 calc risk score 1.9 from (1/12); taking statins due to strong FH  
 Hypertension 05/2019  IFG (impaired fasting glucose) 5/12  Macular degeneration Dr. Kathryn Wren  Osteopenia DEXA t score spine 3.5, hip -2.0 (5/12); 0.9 spine, -2.0 hip FRAX 10/1.4 (8/14);  4.5 spine, -2.1 hip (10/16) Dr Gino Coleman; Fosamax [id number]  Overweight (BMI 25.0-29. 9) 4/5/2018  Right sided sciatica 03/2016 Dr Jaswant Das in [facility]; Dr Faustino Moeller; [facility] 7/16 showed severe multilvevel degen change, large right L4-5 synovial cyst w impingment of R L5 with moderate to severe central stenosis, severe L3-4, mod L2-3 central stenosis and L2-3 left, L3-4bilat, L4-5 left foraminal stenosis  Zoster 1980s Past Surgical History:  
Procedure Laterality Date  HX BLADDER SUSPENSION  4/2006 Dr. Gino Coleman  HX COLONOSCOPY Dr Beverly Potter 2006 hemorrhoids; 3/17 polyp [address]  HX HYSTERECTOMY  2001  
 NEUROLOGICAL PROCEDURE UNLISTED  11/2016  
 partial right L4-5 laminectomy for resection of large synovial cyst Dr Faustino Moeller Social History Socioeconomic History  Marital status:  Spouse name: Not on file  Number of children: 1  Years of education: Not on file  Highest education level: Not on file Occupational History  Occupation:  Social Needs  Financial resource strain: Not on file  Food insecurity:  
  Worry: Not on file Inability: Not on file  Transportation needs:  
  Medical: Not on file Non-medical: Not on file Tobacco Use  Smoking status: Never Smoker  Smokeless tobacco: Never Used Substance and Sexual Activity  Alcohol use: Yes Alcohol/week: 0.0 oz  
  Comment: social  
 Drug use: No  
 Sexual activity: Not Currently Lifestyle  Physical activity:  
  Days per week: Not on file Minutes per session: Not on file  Stress: Not on file Relationships  Social connections:  
  Talks on phone: Not on file Gets together: Not on file Attends Shinto service: Not on file Active member of club or organization: Not on file Attends meetings of clubs or organizations: Not on file Relationship status: Not on file  Intimate partner violence:  
  Fear of current or ex partner: Not on file Emotionally abused: Not on file Physically abused: Not on file Forced sexual activity: Not on file Other Topics Concern  Not on file Social History Narrative  Not on file Current Outpatient Medications Medication Sig  
 amLODIPine (NORVASC) 5 mg tablet Take 1 Tab by mouth daily.  atorvastatin (LIPITOR) 10 mg tablet Take 1 Tab by mouth daily.  ANTIOX #8/OM3/DHA/EPA/LUT/ZEAX (PRESERVISION AREDS 2, OMEGA-3, PO) Take  by mouth.  conjugated estrogens (PREMARIN) 0.625 mg/gram vaginal cream Insert 0.5 g into vagina daily.  clobetasol (TEMOVATE) 0.05 % ointment Apply  to affected area two (2) times a day.  calcium-cholecalciferol, d3, (CALCIUM 600 + D) 600-125 mg-unit Tab Take  by mouth.  MULTIVITAMIN PO Take  by mouth.  ESTROGENS,CONJ/BAZEDOXIFENE (DUAVEE PO) Take  by mouth. No current facility-administered medications for this visit. Allergies Allergen Reactions  Qsymia [Phentermine-Topiramate] Other (comments) Insomnia REVIEW OF SYSTEMS:  Dr. Yuko Vazquez 10/17, mammo 8/18, DEXA 8/17, colo 3/17 Dr Nanci Andrews Ophtho  no vision change or eye pain Oral  no mouth pain, tongue or tooth problems Ears  no hearing loss, ear pain, fullness, no swallowing problems Cardiac  no CP, PND, orthopnea, edema, palpitations or syncope Chest  no breast masses Resp  no wheezing, chronic coughing, dyspnea GI  no heartburn, nausea, vomiting, change in bowel habits, bleeding, hemorrhoids Urinary  no dysuria, hematuria, flank pain, urgency, frequency Constitutional  no wt loss, night sweats, unexplained fevers Neuro  no focal weakness, numbness, paresthesias, incoordination, ataxia, involuntary movements Endo - no polyuria, polydipsia, nocturia, hot flashes Visit Vitals /86 Pulse 63 Temp 98.2 °F (36.8 °C) (Oral) Resp 14 Ht 5' 3\" (1.6 m) Wt 144 lb (65.3 kg) SpO2 98% BMI 25.51 kg/m² A&O x3 Affect is appropriate. Mood stable No apparent distress Anicteric, no JVD, adenopathy or thyromegaly. No carotid bruits or radiated murmur Lungs clear to auscultation, no wheezes or rales Heart showed regular rate and rhythm. No murmur, rubs, gallops Abdomen soft nontender, no hepatosplenomegaly or masses. Extremities without edema. Pulses 1-2+ symmetrically LABS From 1/12 showed   gluc 94,   cr 0.74, gfr 90,  alt 17,                   ldl-p 2079, chol 246, tg 154, hdl 53, ldl-c 162, wbc 5.0, hb 12.4, plt 275 From 5/12 showed   gluc 106, cr 0.82                         hba1c 5.8, ldl-p 1162, chol 159, tg 150, hdl 49, ldl-c 80,         tsh 1.35 From 11/12 showed gluc 100, cr 0.89                         hba1c 5.8, ldl-p 976,   chol 152, tg 167, hdl 49, ldl-c 70 From 5/13 showed                   hba1c 5.8,                   chol 165, tg 111, hdl 54, ldl-c 89 From 12/13 showed gluc 99,   cr 0.92, gfr 68,  alt 12, hba1c 5.7,               chol 155, tg 115, hdl 51, ldl-c 81 From 6/14 showed   gluc 92,   cr 1.03, gfr 55,  alt 13, hba1c 5.6,     chol 161, tg 98,   hdl 54, ldl-c 87,  wbc 5.6, hb 13.6, plt 257, tsh 1.09 From 12/14 showed        hba1c 5.8,     chol 158, tg 108, hdl 64, ldl-c 72 From 6/15 showed   gluc 98,   cr 0.89, gfr>60, alt 10, hba1c 5.9,     chol 151, tg 81,   hdl 56, ldl-c 79 From 12/15 showed        hba1c 5.6,     chol 161, tg 141, hdl 68, ldl-c 65 From 6/16 showed   gluc 99,   cr 0.88, gfr>60,   hba1c 5.8,               wbc 5.6, hb 12.9, plt 252, hep c neg From 11/16 showed gluc 93,   cr 0.75, gfr>60,                 wbc 6.1, hb 12.1, plt 247 From 12/16 showed        hba1c 5.0,     chol 181, tg 119, hdl 76, ldl-c 81 From 7/17 showed        hba1c 5.5,     chol 173, tg 203, hdl 60, ldl-c 72 From 3/18 showed   gluc 96,   cr 0.80, gfr>60, alt 23,                wbc 6.4, hb 13.3, plt 251 From 4/19 showed   gluc 87,   cr 0.79, gfr>60, alt 26,      chol 167, tg 148, hdl 67, ldl-c 70,  wbc 5.9, hb 12.9, plt 275, vit d 40.6 Patient Active Problem List  
Diagnosis Code  Osteopenia FRAX 8/14 10 and 1.4 M85.80  Hyperlipidemia E78.5  Macular degeneration Dr. Pascual Canchola H35.30  
 IFG (impaired fasting glucose) R73.01  
 Arthritis, degenerative M19.90  GERD without esophagitis K21.9  Advance directive in chart Z78.9  Overweight (BMI 25.0-29. 9) E66.3  Primary hypertension I10  
 
ASSESSMENT AND PLAN 1.  DJD. Prn nsaids 2. AMD.  F/U Dr. Pascual Canchola 3. GERD. Prn PPI and avoidance measures 4. Osteopenia. Ca/D/wt bearing exercises. 5.  Hyperlipidemia and strong FH chd.  Continue lipitor 6. IFG. Weight loss, continue dietary and lifestyle measures 7. Colon polyp. Fiber, colo 2022 Dr Navjot Hedrick 8. Overweight. Lifestyle and dietary measures. Portion control reiterated. 9. Probable HTN. Long discussion. Start amlodipine 5 every day after discussing possible sfx. She will continue dietary and lifestyle measures, follow readings and bring in for review at her f/u visit 10. Ophth. She is felt to be average risk for the planned procedure, no contraindications noted. RTC 9/19 Above conditions discussed at length and patient vocalized understanding. All questions answered to patient satisfaction

## 2019-05-13 ENCOUNTER — TELEPHONE (OUTPATIENT)
Dept: INTERNAL MEDICINE CLINIC | Age: 64
End: 2019-05-13

## 2019-05-13 ENCOUNTER — OFFICE VISIT (OUTPATIENT)
Dept: INTERNAL MEDICINE CLINIC | Age: 64
End: 2019-05-13

## 2019-05-13 VITALS
RESPIRATION RATE: 14 BRPM | SYSTOLIC BLOOD PRESSURE: 138 MMHG | WEIGHT: 144 LBS | BODY MASS INDEX: 25.52 KG/M2 | OXYGEN SATURATION: 98 % | DIASTOLIC BLOOD PRESSURE: 86 MMHG | HEIGHT: 63 IN | TEMPERATURE: 98.2 F | HEART RATE: 63 BPM

## 2019-05-13 DIAGNOSIS — Z01.818 PREOP EXAM FOR INTERNAL MEDICINE: Primary | ICD-10-CM

## 2019-05-13 DIAGNOSIS — H26.9 CATARACT OF RIGHT EYE, UNSPECIFIED CATARACT TYPE: ICD-10-CM

## 2019-05-13 DIAGNOSIS — I10 PRIMARY HYPERTENSION: ICD-10-CM

## 2019-05-13 RX ORDER — AMLODIPINE BESYLATE 5 MG/1
5 TABLET ORAL DAILY
Qty: 30 TAB | Refills: 5 | Status: SHIPPED | OUTPATIENT
Start: 2019-05-13 | End: 2019-09-04 | Stop reason: SDUPTHER

## 2019-05-13 NOTE — TELEPHONE ENCOUNTER
At check out pt said she is coming back in 3 months due to blood pressure. Do you want her to have labs also?

## 2019-05-13 NOTE — PROGRESS NOTES
Chief Complaint Patient presents with  Pre-op Exam  
  Patient present for pre-operative clearance on 5/20/2019 for right cataract surgery. Health Maintenance Due Topic Date Due  Shingrix Vaccine Age 50> (1 of 2) 10/23/2003 1. Have you been to the ER, urgent care clinic or hospitalized since your last visit? NO.  
 
2. Have you seen or consulted any other health care providers outside of the [address] [place] [address] since your last visit (Include any pap smears or colon screening)? YES, Dr. Jil Hirsch, eye doctor, last seen last month.

## 2019-06-13 DIAGNOSIS — E78.5 HYPERLIPIDEMIA, UNSPECIFIED HYPERLIPIDEMIA TYPE: ICD-10-CM

## 2019-06-13 RX ORDER — ATORVASTATIN CALCIUM 10 MG/1
10 TABLET, FILM COATED ORAL DAILY
Qty: 90 TAB | Refills: 3 | Status: SHIPPED | OUTPATIENT
Start: 2019-06-13 | End: 2020-04-28 | Stop reason: SDUPTHER

## 2019-06-13 NOTE — TELEPHONE ENCOUNTER
Last Visit: 5/13/19 with MD Mesha De La O  Next Appointment: 8/13/19 with MD Mesha DeL a O  Previous Refill Encounter(s): 6/18/18 #90 with 3 refills    Requested Prescriptions     Pending Prescriptions Disp Refills    atorvastatin (LIPITOR) 10 mg tablet 90 Tab 3     Sig: Take 1 Tab by mouth daily.

## 2019-08-11 NOTE — PROGRESS NOTES
70 y.o. white female who presents for reevaluation    She did well with the cataract surgery by Dr Lee First    No cardiovascular complaints, she was started on amlo at the last visit and getting 120s over 56s mostly. No sfx to report. She continues to shag/line dance 3x/week sometimes up to 2 hours    Denies polyuria, polydipsia, nocturia, vision change. Not checking sugars at this time. Weight is stable    Denies GI or Gu complaints. LAST MEDICARE WELLNESS EXAM: 4/9/19    Past Medical History:   Diagnosis Date    Colon polyp 03/2017    Dr Renee Perez    DJD (degenerative joint disease)     FHx: heart disease     Hyperlipidemia     calc risk score 1.9 from (1/12); taking statins due to strong FH    Hypertension 05/2019    IFG (impaired fasting glucose) 5/12    Macular degeneration     Dr. Allan Conception Osteopenia     DEXA t score spine 3.5, hip -2.0 (5/12); 0.9 spine, -2.0 hip FRAX 10/1.4 (8/14);  4.5 spine, -2.1 hip (10/16) Dr Anita Liu; Fosamax [id number]    Overweight (BMI 25.0-29. 9) 4/5/2018    Right sided sciatica 03/2016    Dr French Villagomez in [facility]; Dr Renate Worley; [facility] 7/16 showed severe multilvevel degen change, large right L4-5 synovial cyst w impingment of R L5 with moderate to severe central stenosis, severe L3-4, mod L2-3 central stenosis and L2-3 left, L3-4bilat, L4-5 left foraminal stenosis    Varicose vein of leg     Zoster 1980s      Past Surgical History:   Procedure Laterality Date    HX BLADDER SUSPENSION  4/2006    Dr. Nahid Langley  05/2019    Dr Sekou Perez 2006 hemorrhoids; 3/17 polyp    HX [address], 1988    HX HYSTERECTOMY  2001    HX OOPHORECTOMY      NEUROLOGICAL PROCEDURE UNLISTED  11/2016    partial right L4-5 laminectomy for resection of large synovial cyst Dr Bhavik Gill History     Socioeconomic History    Marital status:      Spouse name: Not on file    Number of children: 1    Years of education: Not on file    Highest education level: Not on file   Occupational History    Occupation:    Social Needs    Financial resource strain: Not on file    Food insecurity:     Worry: Not on file     Inability: Not on file    Transportation needs:     Medical: Not on file     Non-medical: Not on file   Tobacco Use    Smoking status: Never Smoker    Smokeless tobacco: Never Used   Substance and Sexual Activity    Alcohol use: Yes     Alcohol/week: 0.0 standard drinks     Comment: social    Drug use: No    Sexual activity: Not Currently   Lifestyle    Physical activity:     Days per week: Not on file     Minutes per session: Not on file    Stress: Not on file   Relationships    Social connections:     Talks on phone: Not on file     Gets together: Not on file     Attends Hinduism service: Not on file     Active member of club or organization: Not on file     Attends meetings of clubs or organizations: Not on file     Relationship status: Not on file    Intimate partner violence:     Fear of current or ex partner: Not on file     Emotionally abused: Not on file     Physically abused: Not on file     Forced sexual activity: Not on file   Other Topics Concern    Not on file   Social History Narrative    Not on file     Current Outpatient Medications   Medication Sig    atorvastatin (LIPITOR) 10 mg tablet Take 1 Tab by mouth daily.  amLODIPine (NORVASC) 5 mg tablet Take 1 Tab by mouth daily.  ANTIOX #8/OM3/DHA/EPA/LUT/ZEAX (PRESERVISION AREDS 2, OMEGA-3, PO) Take  by mouth.  conjugated estrogens (PREMARIN) 0.625 mg/gram vaginal cream Insert 0.5 g into vagina daily.  clobetasol (TEMOVATE) 0.05 % ointment Apply  to affected area two (2) times a day.  calcium-cholecalciferol, d3, (CALCIUM 600 + D) 600-125 mg-unit Tab Take  by mouth.  MULTIVITAMIN PO Take  by mouth.  ESTROGENS,CONJ/BAZEDOXIFENE (DUAVEE PO) Take  by mouth.  Indications: NOT TAKING     No current facility-administered medications for this visit. Allergies   Allergen Reactions    Qsymia [Phentermine-Topiramate] Other (comments)     Insomnia       REVIEW OF SYSTEMS:  Dr. Vinnie Olivia 10/17, mammo 8/18, DEXA 8/17, colo 3/17 Dr Emilee Ledezma  Ophtho - no vision change or eye pain  Oral - no mouth pain, tongue or tooth problems  Ears - no hearing loss, ear pain, fullness, no swallowing problems  Cardiac - no CP, PND, orthopnea, edema, palpitations or syncope  Chest - no breast masses  Resp - no wheezing, chronic coughing, dyspnea  GI - no heartburn, nausea, vomiting, change in bowel habits, bleeding, hemorrhoids  Urinary - no dysuria, hematuria, flank pain, urgency, frequency    Visit Vitals  /72   Pulse 83   Temp 98 °F (36.7 °C) (Oral)   Resp 14   Ht 5' 3\" (1.6 m)   Wt 145 lb (65.8 kg)   SpO2 98%   BMI 25.69 kg/m²     A&O x3  Affect is appropriate. Mood stable  No apparent distress  Anicteric, no JVD, adenopathy or thyromegaly. No carotid bruits or radiated murmur  Lungs clear to auscultation, no wheezes or rales  Heart showed regular rate and rhythm. No murmur, rubs, gallops  Abdomen soft nontender, no hepatosplenomegaly or masses. Extremities without edema.   Pulses 1-2+ symmetrically    LABS  From 1/12 showed   gluc 94,   cr 0.74, gfr 90,  alt 17,                   ldl-p 2079, chol 246, tg 154, hdl 53, ldl-c 162, wbc 5.0, hb 12.4, plt 275  From 5/12 showed   gluc 106, cr 0.82                         hba1c 5.8, ldl-p 1162, chol 159, tg 150, hdl 49, ldl-c 80,         tsh 1.35  From 11/12 showed gluc 100, cr 0.89                         hba1c 5.8, ldl-p 976,   chol 152, tg 167, hdl 49, ldl-c 70  From 5/13 showed                   hba1c 5.8,                   chol 165, tg 111, hdl 54, ldl-c 89  From 12/13 showed gluc 99,   cr 0.92, gfr 68,  alt 12, hba1c 5.7,               chol 155, tg 115, hdl 51, ldl-c 81  From 6/14 showed   gluc 92,   cr 1.03, gfr 55,  alt 13, hba1c 5.6,     chol 161, tg 98,   hdl 54, ldl-c 87,  wbc 5.6, hb 13.6, plt 257, tsh 1.09  From 12/14 showed        hba1c 5.8,     chol 158, tg 108, hdl 64, ldl-c 72  From 6/15 showed   gluc 98,   cr 0.89, gfr>60, alt 10, hba1c 5.9,     chol 151, tg 81,   hdl 56, ldl-c 79  From 12/15 showed        hba1c 5.6,     chol 161, tg 141, hdl 68, ldl-c 65  From 6/16 showed   gluc 99,   cr 0.88, gfr>60,   hba1c 5.8,               wbc 5.6, hb 12.9, plt 252, hep c neg  From 11/16 showed gluc 93,   cr 0.75, gfr>60,                 wbc 6.1, hb 12.1, plt 247  From 12/16 showed        hba1c 5.0,     chol 181, tg 119, hdl 76, ldl-c 81  From 7/17 showed        hba1c 5.5,     chol 173, tg 203, hdl 60, ldl-c 72  From 3/18 showed   gluc 96,   cr 0.80, gfr>60, alt 23,                wbc 6.4, hb 13.3, plt 251  From 4/19 showed   gluc 87,   cr 0.79, gfr>60, alt 26,      chol 167, tg 148, hdl 67, ldl-c 70,  wbc 5.9, hb 12.9, plt 275, vit d 40.6    Patient Active Problem List   Diagnosis Code    Osteopenia FRAX 8/14 10 and 1.4 M85.80    Hyperlipidemia E78.5    Macular degeneration Dr. Deep Baugh H35.30    IFG (impaired fasting glucose) R73.01    Arthritis, degenerative M19.90    GERD without esophagitis K21.9    Advance directive in chart Z78.9    Overweight (BMI 25.0-29. 9) E66.3    Primary hypertension I10     ASSESSMENT AND PLAN  1.  DJD. Prn nsaids  2. AMD.  F/U Dr. Deep Baugh  3. GERD. Prn PPI and avoidance measures  4. Osteopenia. Ca/D/wt bearing exercises. 5.  Hyperlipidemia and strong FH chd.  Continue lipitor   6. IFG. Weight loss, continue dietary and lifestyle measures  7. Colon polyp. Fiber, colo 2022 Dr Sandoval Stephenson  8. Overweight. Lifestyle and dietary measures. Portion control reiterated. 9.  Probable HTN. Continue current regimen. RTC 4/20    Above conditions discussed at length and patient vocalized understanding.   All questions answered to patient satisfaction

## 2019-08-13 ENCOUNTER — OFFICE VISIT (OUTPATIENT)
Dept: INTERNAL MEDICINE CLINIC | Age: 64
End: 2019-08-13

## 2019-08-13 ENCOUNTER — HOSPITAL ENCOUNTER (OUTPATIENT)
Dept: MAMMOGRAPHY | Age: 64
Discharge: HOME | End: 2019-08-13
Attending: INTERNAL MEDICINE
Payer: MEDICARE

## 2019-08-13 VITALS
HEART RATE: 83 BPM | HEIGHT: 63 IN | SYSTOLIC BLOOD PRESSURE: 124 MMHG | RESPIRATION RATE: 14 BRPM | DIASTOLIC BLOOD PRESSURE: 72 MMHG | WEIGHT: 145 LBS | BODY MASS INDEX: 25.69 KG/M2 | OXYGEN SATURATION: 98 % | TEMPERATURE: 98 F

## 2019-08-13 DIAGNOSIS — Z12.31 VISIT FOR SCREENING MAMMOGRAM: ICD-10-CM

## 2019-08-13 DIAGNOSIS — I10 PRIMARY HYPERTENSION: Primary | ICD-10-CM

## 2019-08-13 DIAGNOSIS — R73.01 IFG (IMPAIRED FASTING GLUCOSE): ICD-10-CM

## 2019-08-13 DIAGNOSIS — E66.3 OVERWEIGHT (BMI 25.0-29.9): ICD-10-CM

## 2019-08-13 PROCEDURE — 77067 SCR MAMMO BI INCL CAD: CPT

## 2019-09-05 RX ORDER — AMLODIPINE BESYLATE 5 MG/1
TABLET ORAL
Qty: 90 TAB | Refills: 5 | Status: SHIPPED | OUTPATIENT
Start: 2019-09-05 | End: 2019-10-25 | Stop reason: SDUPTHER

## 2019-10-28 RX ORDER — AMLODIPINE BESYLATE 5 MG/1
5 TABLET ORAL DAILY
Qty: 90 TAB | Refills: 3 | Status: SHIPPED | OUTPATIENT
Start: 2019-10-28 | End: 2020-04-28 | Stop reason: SDUPTHER

## 2019-10-28 NOTE — TELEPHONE ENCOUNTER
Pharmacy never received Rx from 9/5/19 - re-pended Rx. Please sign if appropriate. Last Visit: 8/13/19 with MD Shayla Cover  Next Appointment: 4/28/20 with MD Shayla Cover    Requested Prescriptions     Pending Prescriptions Disp Refills    amLODIPine (NORVASC) 5 mg tablet 90 Tab 3     Sig: Take 1 Tab by mouth daily.

## 2020-04-21 ENCOUNTER — HOSPITAL ENCOUNTER (OUTPATIENT)
Dept: LAB | Age: 65
Discharge: HOME | End: 2020-04-21
Payer: MEDICARE

## 2020-04-21 LAB
ALBUMIN SERPL-MCNC: 3.8 G/DL (ref 3.4–5)
ALBUMIN/GLOB SERPL: 1 {RATIO} (ref 0.8–1.7)
ALP SERPL-CCNC: 86 U/L (ref 45–117)
ALT SERPL-CCNC: 25 U/L (ref 13–56)
ANION GAP SERPL CALC-SCNC: 6 MMOL/L (ref 3–18)
AST SERPL-CCNC: 20 U/L (ref 10–38)
BILIRUB SERPL-MCNC: 1.1 MG/DL (ref 0.2–1)
BUN SERPL-MCNC: 15 MG/DL (ref 7–18)
BUN/CREAT SERPL: 17 (ref 12–20)
CALCIUM SERPL-MCNC: 9.2 MG/DL (ref 8.5–10.1)
CHLORIDE SERPL-SCNC: 108 MMOL/L (ref 100–111)
CHOLEST SERPL-MCNC: 160 MG/DL
CO2 SERPL-SCNC: 28 MMOL/L (ref 21–32)
CREAT SERPL-MCNC: 0.86 MG/DL (ref 0.6–1.3)
ERYTHROCYTE [DISTWIDTH] IN BLOOD BY AUTOMATED COUNT: 13.3 % (ref 11.6–14.5)
GLOBULIN SER CALC-MCNC: 3.7 G/DL (ref 2–4)
GLUCOSE SERPL-MCNC: 92 MG/DL (ref 74–99)
HCT VFR BLD AUTO: 40.8 % (ref 35–45)
HDLC SERPL-MCNC: 62 MG/DL (ref 40–60)
HDLC SERPL: 2.6 {RATIO} (ref 0–5)
HGB BLD-MCNC: 13.5 G/DL (ref 12–16)
LDLC SERPL CALC-MCNC: 64.4 MG/DL (ref 0–100)
LIPID PROFILE,FLP: ABNORMAL
MCH RBC QN AUTO: 30.1 PG (ref 24–34)
MCHC RBC AUTO-ENTMCNC: 33.1 G/DL (ref 31–37)
MCV RBC AUTO: 90.9 FL (ref 74–97)
PLATELET # BLD AUTO: 295 K/UL (ref 135–420)
PMV BLD AUTO: 10.7 FL (ref 9.2–11.8)
POTASSIUM SERPL-SCNC: 4.3 MMOL/L (ref 3.5–5.5)
PROT SERPL-MCNC: 7.5 G/DL (ref 6.4–8.2)
RBC # BLD AUTO: 4.49 M/UL (ref 4.2–5.3)
SODIUM SERPL-SCNC: 142 MMOL/L (ref 136–145)
TRIGL SERPL-MCNC: 168 MG/DL (ref ?–150)
VLDLC SERPL CALC-MCNC: 33.6 MG/DL
WBC # BLD AUTO: 7.4 K/UL (ref 4.6–13.2)

## 2020-04-21 PROCEDURE — 36415 COLL VENOUS BLD VENIPUNCTURE: CPT

## 2020-04-21 PROCEDURE — 80061 LIPID PANEL: CPT

## 2020-04-21 PROCEDURE — 85027 COMPLETE CBC AUTOMATED: CPT

## 2020-04-21 PROCEDURE — 80053 COMPREHEN METABOLIC PANEL: CPT

## 2020-04-27 NOTE — PROGRESS NOTES
This is a subsequent Medicare Annual Wellness Exam    I have reviewed the patient's medical history in detail and updated the computerized patient record. History     Past Medical History:   Diagnosis Date    Colon polyp 03/2017    Dr Jose ALBRIGHT (degenerative joint disease)     FHx: heart disease     Hyperlipidemia     calc risk score 1.9 from (1/12); taking statins due to strong FH    Hypertension 05/2019    IFG (impaired fasting glucose) 5/12    Macular degeneration     Dr. Maximo Leiva Osteopenia     DEXA t score spine 3.5, hip -2.0 (5/12); 0.9 spine, -2.0 hip FRAX 10/1.4 (8/14);  4.5 spine, -2.1 hip (10/16) Dr Mary Schwab; Fosamax [id number]    Overweight (BMI 25.0-29. 9) 4/5/2018    Right sided sciatica 03/2016    Dr Kyler Carter in [facility]; Dr Sixto Matias; Wadie Cornejo 7/16 showed severe multilvevel degen change, large right L4-5 synovial cyst w impingment of R L5 with moderate to severe central stenosis, severe L3-4, mod L2-3 central stenosis and L2-3 left, L3-4bilat, L4-5 left foraminal stenosis    Varicose vein of leg     Zoster 1980s       Past Surgical History:   Procedure Laterality Date    HX BLADDER SUSPENSION  4/2006    Dr. Marlon Luna  05/2019    Dr Keren Renteria 2006 hemorrhoids; 3/17 polyp    HX [address], 1988    HX HYSTERECTOMY  2001    later oophorectomy    NEUROLOGICAL PROCEDURE UNLISTED  11/2016    partial RIGHT L4-5 laminectomy for resection of large synovial cyst Dr Sixto Matias     Current Outpatient Medications   Medication Sig Dispense Refill    amLODIPine (NORVASC) 5 mg tablet Take 1 Tab by mouth daily. 90 Tab 3    atorvastatin (LIPITOR) 10 mg tablet Take 1 Tab by mouth daily. 90 Tab 3    ESTROGENS,CONJ/BAZEDOXIFENE (DUAVEE PO) Take  by mouth. Indications: NOT TAKING      ANTIOX #8/OM3/DHA/EPA/LUT/ZEAX (PRESERVISION AREDS 2, OMEGA-3, PO) Take  by mouth.       conjugated estrogens (PREMARIN) 0.625 mg/gram vaginal cream Insert 0.5 g into vagina daily.  clobetasol (TEMOVATE) 0.05 % ointment Apply  to affected area two (2) times a day.  calcium-cholecalciferol, d3, (CALCIUM 600 + D) 600-125 mg-unit Tab Take  by mouth.  MULTIVITAMIN PO Take  by mouth. Allergies   Allergen Reactions    Qsymia [Phentermine-Topiramate] Other (comments)     Insomnia       Family History   Problem Relation Age of Onset    Heart Disease Mother     Other Father         ESRD    Heart Disease Father     Heart Disease Brother     Elevated Lipids Brother     Diabetes Brother     Cancer Maternal Aunt 54        breast    Diabetes Other         maternal cousin breast     Social History     Tobacco Use    Smoking status: Never Smoker    Smokeless tobacco: Never Used   Substance Use Topics    Alcohol use: Yes     Alcohol/week: 0.0 standard drinks     Comment: social     Depression Risk Factor Screening:     3 most recent PHQ Screens 5/13/2019   Little interest or pleasure in doing things Not at all   Feeling down, depressed, irritable, or hopeless Not at all   Total Score PHQ 2 0     SCREENINGS  Colonoscopy last done 3/17 Dr Fidelia Borjas last done 8/19  DEXA last done 10/16 Dr Yu Pathak    Immunization History   Administered Date(s) Administered    Influenza High Dose Vaccine PF 10/29/2018    Influenza Vaccine 10/01/2017    Pneumococcal Conjugate (PCV-13) 04/09/2019    Tdap 06/11/2014    Zoster Vaccine, Live 12/11/2014     Alcohol Risk Factor Screening: You do not drink alcohol or very rarely. Functional Ability and Level of Safety:   Hearing Loss  Hearing is good. Activities of Daily Living  The home contains: no safety equipment. Patient does total self care    Fall Risk  Fall Risk Assessment, last 12 mths 5/13/2019   Able to walk? Yes   Fall in past 12 months?  No       Abuse Screen  Patient is not abused    Cognitive Screening   Evaluation of Cognitive Function:  Has your family/caregiver stated any concerns about your memory: no  Normal    Patient Care Team   Patient Care Team:  German Calix MD as PCP - General (Internal Medicine)  German Calix MD as PCP - [facility]  Barbie Dowell RN as [address] [place] [address] (Internal Medicine)  Abby Carr MD as Surgeon (Surgical Oncology)    Assessment/Plan   Education and counseling provided:  Are appropriate based on today's review and evaluation  End-of-Life planning (with patient's consent)  Pneumococcal Vaccine  Influenza Vaccine  Screening Mammography  Colorectal cancer screening tests  Cardiovascular screening blood test  Diabetes screening test    Diagnoses and all orders for this visit:    1. Medicare annual wellness visit, subsequent    2. Primary hypertension  -     CBC W/O DIFF; Future  -     LIPID PANEL; Future  -     amLODIPine (NORVASC) 5 mg tablet; Take 1 Tab by mouth daily. 3. GERD without esophagitis    4. IFG (impaired fasting glucose)  -     METABOLIC PANEL, COMPREHENSIVE; Future  -     HEMOGLOBIN A1C W/O EAG; Future    5. Osteoarthritis, unspecified osteoarthritis type, unspecified site    6. Osteopenia, unspecified location    7. Hyperlipidemia, unspecified hyperlipidemia type  -     LIPID PANEL; Future  -     atorvastatin (LIPITOR) 10 mg tablet; Take 1 Tab by mouth daily. 8. Advanced directives, counseling/discussion  -     ADVANCE CARE PLANNING FIRST 30 MINS    9. Screening for alcoholism  -     [place] ANNUAL ALCOHOL SCREEN 15 MIN    10. Screening for depression  -     DEPRESSION SCREEN ANNUAL    11.  Screening for diabetes mellitus        Health Maintenance Due   Topic Date Due    Shingrix Vaccine Age 49> (1 of 2) 10/23/2003    A1C test (Diabetic or Prediabetic)  07/05/2018    Pneumococcal 65+ years (2 of 2 - PPSV23) 04/09/2020    Medicare Yearly Exam  04/09/2020     Advocated shingrix and pvx23 locally    Consent: Norberto Klinefelter, who was seen by synchronous (real-time) audio-video technology, and/or her healthcare decision maker, is aware that this patient-initiated, Telehealth encounter on 4/28/2020 is a billable service. While AWVs are fully covered by Medicare, any services rendered on this date that are not included in an AWV are subject to additional billing, with coverage as determined by her insurance carrier. She is aware that she may receive a bill for any such additional services and has provided verbal consent to proceed: Yes. Health Maintenance Due   Topic Date Due    Shingrix Vaccine Age 49> (1 of 2) 10/23/2003    A1C test (Diabetic or Prediabetic)  07/05/2018    Pneumococcal 65+ years (2 of 2 - PPSV23) 04/09/2020    Medicare Yearly Exam  04/09/2020         Francisca Vu is a 75 y.o. female who was evaluated by a video visit encounter for concerns as above. Patient identification was verified prior to start of the visit. A caregiver was present when appropriate. Due to this being a TeleHealth encounter (During LACED-23 public health emergency), evaluation of the following organ systems was limited: Vitals/Constitutional/EENT/Resp/CV/GI//MS/Neuro/Skin/Heme-Lymph-Imm. Pursuant to the emergency declaration under the [facility] waiver authority and the [employer] Act, this Virtual  Visit was conducted, with patient's (and/or legal guardian's) consent, to reduce the patient's risk of exposure to COVID-19 and provide necessary medical care. Services were provided through a video synchronous discussion virtually to substitute for in-person clinic visit. Patient and provider were located at their individual homes.     Lesley Cazares MD

## 2020-04-27 NOTE — PROGRESS NOTES
Narda Durbin is a 75 y.o. female who was seen by synchronous (real-time) audio-video technology on 4/28/2020. Consent: Narda Durbin, who was seen by synchronous (real-time) audio-video technology, and/or her healthcare decision maker, is aware that this patient-initiated, Telehealth encounter on 4/28/2020 is a billable service, with coverage as determined by her insurance carrier. She is aware that she may receive a bill and has provided verbal consent to proceed: Yes. Assessment & Plan:   Diagnoses and all orders for this visit:    1. Medicare annual wellness visit, subsequent    2. Primary hypertension  -     CBC W/O DIFF; Future  -     LIPID PANEL; Future  -     amLODIPine (NORVASC) 5 mg tablet; Take 1 Tab by mouth daily. 3. GERD without esophagitis    4. IFG (impaired fasting glucose)  -     METABOLIC PANEL, COMPREHENSIVE; Future  -     HEMOGLOBIN A1C W/O EAG; Future    5. Osteoarthritis, unspecified osteoarthritis type, unspecified site    6. Osteopenia, unspecified location    7. Hyperlipidemia, unspecified hyperlipidemia type  -     LIPID PANEL; Future  -     atorvastatin (LIPITOR) 10 mg tablet; Take 1 Tab by mouth daily. 8. Advanced directives, counseling/discussion  -     ADVANCE CARE PLANNING FIRST 30 MINS    9. Screening for alcoholism  -     [place] ANNUAL ALCOHOL SCREEN 15 MIN    10. Screening for depression  -     DEPRESSION SCREEN ANNUAL    11. Screening for diabetes mellitus          I spent at least 23 minutes on this visit with this established patient. [phone]  Subjective:   Narda Durbin is a 75 y.o. female who was seen for No chief complaint on file. Objective: There were no vitals taken for this visit.    General: alert, cooperative, no distress   Mental  status: normal mood, behavior, speech, dress, motor activity, and thought processes, able to follow commands   HENT: NCAT   Neck: no visualized mass   Resp: no respiratory distress   Neuro: no gross deficits   Skin: no discoloration or lesions of concern on visible areas   Psychiatric: normal affect, consistent with stated mood, no evidence of hallucinations     Additional exam findings: We discussed the expected course, resolution and complications of the diagnosis(es) in detail. Medication risks, benefits, costs, interactions, and alternatives were discussed as indicated. I advised her to contact the office if her condition worsens, changes or fails to improve as anticipated. She expressed understanding with the diagnosis(es) and plan. Genaro Palumbo is a 75 y.o. female who was evaluated by a video visit encounter for concerns as above. Patient identification was verified prior to start of the visit. A caregiver was present when appropriate. Due to this being a TeleHealth encounter (During [facility] emergency), evaluation of the following organ systems was limited: Vitals/Constitutional/EENT/Resp/CV/GI//MS/Neuro/Skin/Heme-Lymph-Imm. Pursuant to the emergency declaration under the [facility] waiver authority and the [employer] Act, this Virtual  Visit was conducted, with patient's (and/or legal guardian's) consent, to reduce the patient's risk of exposure to COVID-19 and provide necessary medical care. Services were provided through a video synchronous discussion virtually to substitute for in-person clinic visit. Patient and provider were located at their individual homes. Cr Babcock MD    75 y.o. WHITE OR  female who presents for evaluation. No cardiovascular complaints, her bp has been controlled when she checks. Prior to the lock down, she was shag/line dancing 3x/week sometimes up to 2 hours. Currently just doing chores up and down her 3 story home along with walking    Denies polyuria, polydipsia, nocturia, vision change.   Not checking sugars at this time. Weight is stable    Denies GI or Gu complaints. She has f/u Dr Shields Stands later this summer/fall    LAST MEDICARE WELLNESS EXAM: 4/9/19, 4/28/20    Past Medical History:   Diagnosis Date    Colon polyp 03/2017    Dr Yoni Banks    DJD (degenerative joint disease)     FHx: heart disease     Hyperlipidemia     calc risk score 1.9 from (1/12); taking statins due to strong FH    Hypertension 05/2019    IFG (impaired fasting glucose) 5/12    Macular degeneration     Dr. Ric Renteria Osteopenia     DEXA t score spine 3.5, hip -2.0 (5/12); 0.9 spine, -2.0 hip FRAX 10/1.4 (8/14);  4.5 spine, -2.1 hip (10/16) Dr Shields Stands; Fosamax [id number]    Overweight (BMI 25.0-29. 9) 4/5/2018    Right sided sciatica 03/2016    Dr Mike Delong in [facility]; Dr Faye Wray; [facility] 7/16 showed severe multilvevel degen change, large right L4-5 synovial cyst w impingment of R L5 with moderate to severe central stenosis, severe L3-4, mod L2-3 central stenosis and L2-3 left, L3-4bilat, L4-5 left foraminal stenosis    Varicose vein of leg     Zoster 1980s      Past Surgical History:   Procedure Laterality Date    HX BLADDER SUSPENSION  4/2006    Dr. Ye Webster  05/2019    Dr Regina Banks 2006 hemorrhoids; 3/17 polyp    [address]X [address], 1988    HX HYSTERECTOMY  2001    later oophorectomy    NEUROLOGICAL PROCEDURE UNLISTED  11/2016    partial RIGHT L4-5 laminectomy for resection of large synovial cyst Dr Vizcaino Scale History    Marital status:      Spouse name: Not on file    Number of children: 1    Years of education: Not on file    Highest education level: Not on file   Occupational History    Occupation:    Social Needs    Financial resource strain: Not on file    Food insecurity     Worry: Not on file     Inability: Not on file   [employer] needs     Medical: Not on file     Non-medical: Not on file   Tobacco Use  Smoking status: Never Smoker    Smokeless tobacco: Never Used   Substance and Sexual Activity    Alcohol use: Yes     Alcohol/week: 0.0 standard drinks     Comment: social    Drug use: No    Sexual activity: Not Currently   Lifestyle    Physical activity     Days per week: Not on file     Minutes per session: Not on file    Stress: Not on file   Relationships    Social connections     Talks on phone: Not on file     Gets together: Not on file     Attends [employer] service: Not on file     Active member of club or organization: Not on file     Attends meetings of clubs or organizations: Not on file     Relationship status: Not on file    Intimate partner violence     Fear of current or ex partner: Not on file     Emotionally abused: Not on file     Physically abused: Not on file     Forced sexual activity: Not on file   Other Topics Concern    Not on file   Social History Narrative    Not on file     Current Outpatient Medications   Medication Sig    amLODIPine (NORVASC) 5 mg tablet Take 1 Tab by mouth daily.  atorvastatin (LIPITOR) 10 mg tablet Take 1 Tab by mouth daily.  ESTROGENS,CONJ/BAZEDOXIFENE (DUAVEE PO) Take  by mouth. Indications: NOT TAKING    ANTIOX #8/OM3/DHA/EPA/LUT/ZEAX (PRESERVISION AREDS 2, OMEGA-3, PO) Take  by mouth.  conjugated estrogens (PREMARIN) 0.625 mg/gram vaginal cream Insert 0.5 g into vagina daily.  clobetasol (TEMOVATE) 0.05 % ointment Apply  to affected area two (2) times a day.  calcium-cholecalciferol, d3, (CALCIUM 600 + D) 600-125 mg-unit Tab Take  by mouth.  MULTIVITAMIN PO Take  by mouth. No current facility-administered medications for this visit.       Allergies   Allergen Reactions    Qsymia [Phentermine-Topiramate] Other (comments)     Insomnia       REVIEW OF SYSTEMS:  Dr. Criss Moreno 2019, mammo 8/19, DEXA 8/17, colo 3/17 Dr Danielito Felton  Ophtho  no vision change or eye pain  Oral  no mouth pain, tongue or tooth problems  Ears  no hearing loss, ear pain, fullness, no swallowing problems  Cardiac  no CP, PND, orthopnea, edema, palpitations or syncope  Chest  no breast masses  Resp  no wheezing, chronic coughing, dyspnea  GI  no heartburn, nausea, vomiting, change in bowel habits, bleeding, hemorrhoids  Urinary  no dysuria, hematuria, flank pain, urgency, frequency    LABS  From 1/12 showed   gluc 94,   cr 0.74, gfr 90,  alt 17,                   ldl-p 2079, chol 246, tg 154, hdl 53, ldl-c 162, wbc 5.0, hb 12.4, plt 275  From 5/12 showed   gluc 106, cr 0.82                         hba1c 5.8, ldl-p 1162, chol 159, tg 150, hdl 49, ldl-c 80,         tsh 1.35  From 11/12 showed gluc 100, cr 0.89                         hba1c 5.8, ldl-p 976,   chol 152, tg 167, hdl 49, ldl-c 70  From 5/13 showed                   hba1c 5.8,                   chol 165, tg 111, hdl 54, ldl-c 89  From 12/13 showed gluc 99,   cr 0.92, gfr 68,  alt 12, hba1c 5.7,               chol 155, tg 115, hdl 51, ldl-c 81  From 6/14 showed   gluc 92,   cr 1.03, gfr 55,  alt 13, hba1c 5.6,     chol 161, tg 98,   hdl 54, ldl-c 87,  wbc 5.6, hb 13.6, plt 257, tsh 1.09  From 12/14 showed        hba1c 5.8,     chol 158, tg 108, hdl 64, ldl-c 72  From 6/15 showed   gluc 98,   cr 0.89, gfr>60, alt 10, hba1c 5.9,     chol 151, tg 81,   hdl 56, ldl-c 79  From 12/15 showed        hba1c 5.6,     chol 161, tg 141, hdl 68, ldl-c 65  From 6/16 showed   gluc 99,   cr 0.88, gfr>60,   hba1c 5.8,               wbc 5.6, hb 12.9, plt 252, hep c neg  From 11/16 showed gluc 93,   cr 0.75, gfr>60,                 wbc 6.1, hb 12.1, plt 247  From 12/16 showed        hba1c 5.0,     chol 181, tg 119, hdl 76, ldl-c 81  From 7/17 showed        hba1c 5.5,     chol 173, tg 203, hdl 60, ldl-c 72  From 3/18 showed   gluc 96,   cr 0.80, gfr>60, alt 23,                wbc 6.4, hb 13.3, plt 251  From 4/19 showed   gluc 87,   cr 0.79, gfr>60, alt 26,      chol 167, tg 148, hdl 67, ldl-c 70,  wbc 5.9, hb 12.9, plt 275, vit d 40.6    Results for orders placed or performed during the hospital encounter of 04/21/20   CBC W/O DIFF   Result Value Ref Range    WBC 7.4 4.6 - 13.2 K/uL    RBC 4.49 4.20 - 5.30 M/uL    HGB 13.5 12.0 - 16.0 g/dL    HCT 40.8 35.0 - 45.0 %    MCV 90.9 74.0 - 97.0 FL    MCH 30.1 24.0 - 34.0 PG    MCHC 33.1 31.0 - 37.0 g/dL    RDW 13.3 11.6 - 14.5 %    PLATELET 975 546 - 775 K/uL    MPV 10.7 9.2 - 11.8 FL   LIPID PANEL   Result Value Ref Range    LIPID PROFILE          Cholesterol, total 160 <200 MG/DL    Triglyceride 168 (H) <150 MG/DL    HDL Cholesterol 62 (H) 40 - 60 MG/DL    LDL, calculated 64.4 0 - 100 MG/DL    VLDL, calculated 33.6 MG/DL    CHOL/HDL Ratio 2.6 0 - 5.0     METABOLIC PANEL, COMPREHENSIVE   Result Value Ref Range    Sodium 142 136 - 145 mmol/L    Potassium 4.3 3.5 - 5.5 mmol/L    Chloride 108 100 - 111 mmol/L    CO2 28 21 - 32 mmol/L    Anion gap 6 3.0 - 18 mmol/L    Glucose 92 74 - 99 mg/dL    BUN 15 7.0 - 18 MG/DL    Creatinine 0.86 0.6 - 1.3 MG/DL    BUN/Creatinine ratio 17 12 - 20      GFR est AA >60 >60 ml/min/1.73m2    GFR est non-AA >60 >60 ml/min/1.73m2    Calcium 9.2 8.5 - 10.1 MG/DL    Bilirubin, total 1.1 (H) 0.2 - 1.0 MG/DL    ALT (SGPT) 25 13 - 56 U/L    AST (SGOT) 20 10 - 38 U/L    Alk. phosphatase 86 45 - 117 U/L    Protein, total 7.5 6.4 - 8.2 g/dL    Albumin 3.8 3.4 - 5.0 g/dL    Globulin 3.7 2.0 - 4.0 g/dL    A-G Ratio 1.0 0.8 - 1.7         Patient Active Problem List   Diagnosis Code    Osteopenia FRAX 8/14 10 and 1.4 M85.80    Hyperlipidemia E78.5    Macular degeneration Dr. Nivia Espinoza H35.30    IFG (impaired fasting glucose) R73.01    Arthritis, degenerative M19.90    GERD without esophagitis K21.9    Advance directive in chart Z78.9    Overweight (BMI 25.0-29. 9) E66.3    Primary hypertension I10     ASSESSMENT AND PLAN  1.  DJD. Prn nsaids  2. AMD.  F/U Dr. Nivia Espinoza  3. GERD. Prn PPI and avoidance measures  4. Osteopenia. Ca/D/wt bearing exercises. Per Dr Jocelyn Green  5. Hyperlipidemia and strong FH chd.  Continue lipitor which was refilled  6. IFG. Weight loss, continue dietary and lifestyle measures  7. Colon polyp. Fiber, colo 2022 Dr Will Musa  8. Overweight. Lifestyle and dietary measures. Portion control reiterated. 9.  HTN. Continue current regimen. RTC 4/21    Above conditions discussed at length and patient vocalized understanding.   All questions answered to patient satisfaction

## 2020-04-28 ENCOUNTER — VIRTUAL VISIT (OUTPATIENT)
Dept: INTERNAL MEDICINE CLINIC | Age: 65
End: 2020-04-28

## 2020-04-28 DIAGNOSIS — M85.80 OSTEOPENIA, UNSPECIFIED LOCATION: ICD-10-CM

## 2020-04-28 DIAGNOSIS — Z13.1 SCREENING FOR DIABETES MELLITUS: ICD-10-CM

## 2020-04-28 DIAGNOSIS — M19.90 OSTEOARTHRITIS, UNSPECIFIED OSTEOARTHRITIS TYPE, UNSPECIFIED SITE: ICD-10-CM

## 2020-04-28 DIAGNOSIS — R73.01 IFG (IMPAIRED FASTING GLUCOSE): ICD-10-CM

## 2020-04-28 DIAGNOSIS — Z00.00 MEDICARE ANNUAL WELLNESS VISIT, SUBSEQUENT: Primary | ICD-10-CM

## 2020-04-28 DIAGNOSIS — I10 PRIMARY HYPERTENSION: ICD-10-CM

## 2020-04-28 DIAGNOSIS — E78.5 HYPERLIPIDEMIA, UNSPECIFIED HYPERLIPIDEMIA TYPE: ICD-10-CM

## 2020-04-28 DIAGNOSIS — Z13.39 SCREENING FOR ALCOHOLISM: ICD-10-CM

## 2020-04-28 DIAGNOSIS — Z13.31 SCREENING FOR DEPRESSION: ICD-10-CM

## 2020-04-28 DIAGNOSIS — K21.9 GERD WITHOUT ESOPHAGITIS: ICD-10-CM

## 2020-04-28 DIAGNOSIS — Z71.89 ADVANCED DIRECTIVES, COUNSELING/DISCUSSION: ICD-10-CM

## 2020-04-28 RX ORDER — ATORVASTATIN CALCIUM 10 MG/1
10 TABLET, FILM COATED ORAL DAILY
Qty: 90 TAB | Refills: 3 | Status: SHIPPED | OUTPATIENT
Start: 2020-04-28 | End: 2021-04-29 | Stop reason: SDUPTHER

## 2020-04-28 RX ORDER — AMLODIPINE BESYLATE 5 MG/1
5 TABLET ORAL DAILY
Qty: 90 TAB | Refills: 3 | Status: SHIPPED | OUTPATIENT
Start: 2020-04-28 | End: 2021-04-29 | Stop reason: SDUPTHER

## 2020-04-28 NOTE — PATIENT INSTRUCTIONS
Medicare Wellness Visit, Female The best way to live healthy is to have a lifestyle where you eat a well-balanced diet, exercise regularly, limit alcohol use, and quit all forms of tobacco/nicotine, if applicable. Regular preventive services are another way to keep healthy. Preventive services (vaccines, screening tests, monitoring & exams) can help personalize your care plan, which helps you manage your own care. Screening tests can find health problems at the earliest stages, when they are easiest to treat. [facility] follows the current, evidence-based guidelines published by the [facility] Nader Calix ([facility]) when recommending preventive services for our patients. Because we follow these guidelines, sometimes recommendations change over time as research supports it. (For example, mammograms used to be recommended annually. Even though Medicare will still pay for an annual mammogram, the newer guidelines recommend a mammogram every two years for women of average risk). Of course, you and your doctor may decide to screen more often for some diseases, based on your risk and your co-morbidities (chronic disease you are already diagnosed with). Preventive services for you include: - Medicare offers their members a free annual wellness visit, which is time for you and your primary care provider to discuss and plan for your preventive service needs. Take advantage of this benefit every year! 
-All adults over the age of 72 should receive the recommended pneumonia vaccines. Current USPSTF guidelines recommend a series of two vaccines for the best pneumonia protection.  
-All adults should have a flu vaccine yearly and a tetanus vaccine every 10 years.  
-All adults age 48 and older should receive the shingles vaccines (series of two vaccines). -All adults age 38-68 who are overweight should have a diabetes screening test once every three years. -All adults born between [birth year] and 1967 should be screened once for Hepatitis C. 
-Other screening tests and preventive services for persons with diabetes include: an eye exam to screen for diabetic retinopathy, a kidney function test, a foot exam, and stricter control over your cholesterol.  
-Cardiovascular screening for adults with routine risk involves an electrocardiogram (ECG) at intervals determined by your doctor.  
-Colorectal cancer screenings should be done for adults age 54-65 with no increased risk factors for colorectal cancer. There are a number of acceptable methods of screening for this type of cancer. Each test has its own benefits and drawbacks. Discuss with your doctor what is most appropriate for you during your annual wellness visit. The different tests include: colonoscopy (considered the best screening method), a fecal occult blood test, a fecal DNA test, and sigmoidoscopy. 
 
-A bone mass density test is recommended when a woman turns 65 to screen for osteoporosis. This test is only recommended one time, as a screening. Some providers will use this same test as a disease monitoring tool if you already have osteoporosis. -Breast cancer screenings are recommended every other year for women of normal risk, age 54-69. 
-Cervical cancer screenings for women over age 72 are only recommended with certain risk factors. Here is a list of your current Health Maintenance items (your personalized list of preventive services) with a due date: 
Health Maintenance Due Topic Date Due  Shingles Vaccine (1 of 2) 10/23/2003  Hemoglobin A1C    07/05/2018  Pneumococcal Vaccine (2 of 2 - PPSV23) 04/09/2020 Haile Nelson Annual Well Visit  04/09/2020

## 2020-04-28 NOTE — ACP (ADVANCE CARE PLANNING)
Advance Care Planning       Advance Care Planning (ACP) Physician/NP/PA (Provider) Conversation      Date of ACP Conversation: 4/28/2020    Conversation Conducted with:   Patient with Decision Making Capacity    *If present, Decision Maker was asked to consider and make decisions based on patient values, known preferences, or best interests. Current Designated Health Care Decision Maker:   (as entered in [address] field)    If no Decision Maker listed above or available through scanned documents, then:    [address]:  Who do you trust to make healthcare decisions for you? Name: son Mayuri Delarosa phone  number:   Can this person be reached and be able to respond quickly, such as within a few minutes or hours? YES  Who would be your back-up decision maker? Name Steph Solitario (daughter in law) phone number:      For below questions, when conducting conversation with Kailyn, substitute \"she\" and \"her\" for \"you\" and \"your\". Hospitalization: \"If your health were to worsen and it became clear that your chance of recovery was unlikely, what would your preference be regarding hospitalization? \"  If the patient would want hospitalization, answer \"yes\". If the patient would prefer comfort-focused treatment without hospitalization, answer \"no\". yes      Ventilation: \"If you were in your present state of health and suddenly became very ill and were unable to breathe on your own, what would your preference be about the use of a ventilator (breathing machine) if it were available to you? \"    If patient would desire the use of a ventilator (breathing machine), answer \"yes\", if not answer \"no\":yes  \"If your health were to worsen and it became clear that your chance of recovery was unlikely, would that change your answer? \"   yes    Resuscitation:  \"CPR works best to restart the heart when there is a sudden event, like a heart attack, in someone who is otherwise healthy. Unfortunately, CPR does not typically restart the heart for people who have serious health conditions or who are very sick. \"    \"In the event your heart stopped, would you want attempts to restart your heart (answer \"yes\" for attempt to resuscitate) or would you prefer a natural death (answer \"no\" for do not attempt to resuscitate)? \"   yes  \"If your health were to worsen and it became clear that your chance of recovery was unlikely, would that change your answer? \"   yes        Conversation Outcomes / Follow-Up Plan:   She will give us copy of her newly drafted POA/AMD which names son Jannetta Claude (and NOT brother Osvaldo) as POA      Length of ACP Conversation in minutes:  16 minutes    Coding Instructions:  ACP is eligible as a billable service through Medicare in conjunction with either an AWV or a problem-oriented E/M visit if it entails dedicated discussion >/=16 minutes. Other insurance carriers may or may not cover ACP services.   16-30 minutes: code 16566  >30 minutes: code [id number] modifier if reported with an E/M visit (deductible and co-pays may apply)  Add 33 modifier if reported with an AWV to waive patient cost-sharing

## 2020-09-25 ENCOUNTER — HOSPITAL ENCOUNTER (OUTPATIENT)
Dept: MAMMOGRAPHY | Age: 65
Discharge: HOME | End: 2020-09-25
Attending: INTERNAL MEDICINE
Payer: MEDICARE

## 2020-09-25 DIAGNOSIS — Z12.31 VISIT FOR SCREENING MAMMOGRAM: ICD-10-CM

## 2020-09-25 PROCEDURE — 77063 BREAST TOMOSYNTHESIS BI: CPT

## 2021-04-22 ENCOUNTER — APPOINTMENT (OUTPATIENT)
Dept: INTERNAL MEDICINE CLINIC | Age: 66
End: 2021-04-22

## 2021-04-22 ENCOUNTER — HOSPITAL ENCOUNTER (OUTPATIENT)
Dept: LAB | Age: 66
Discharge: HOME | End: 2021-04-22
Payer: MEDICARE

## 2021-04-22 DIAGNOSIS — E78.5 HYPERLIPIDEMIA, UNSPECIFIED HYPERLIPIDEMIA TYPE: ICD-10-CM

## 2021-04-22 DIAGNOSIS — I10 PRIMARY HYPERTENSION: ICD-10-CM

## 2021-04-22 DIAGNOSIS — R73.01 IFG (IMPAIRED FASTING GLUCOSE): ICD-10-CM

## 2021-04-22 LAB
ALBUMIN SERPL-MCNC: 3.8 G/DL (ref 3.4–5)
ALBUMIN/GLOB SERPL: 1 {RATIO} (ref 0.8–1.7)
ALP SERPL-CCNC: 88 U/L (ref 45–117)
ALT SERPL-CCNC: 28 U/L (ref 13–56)
ANION GAP SERPL CALC-SCNC: 5 MMOL/L (ref 3–18)
AST SERPL-CCNC: 15 U/L (ref 10–38)
BILIRUB SERPL-MCNC: 1.3 MG/DL (ref 0.2–1)
BUN SERPL-MCNC: 17 MG/DL (ref 7–18)
BUN/CREAT SERPL: 19 (ref 12–20)
CALCIUM SERPL-MCNC: 9.1 MG/DL (ref 8.5–10.1)
CHLORIDE SERPL-SCNC: 107 MMOL/L (ref 100–111)
CHOLEST SERPL-MCNC: 180 MG/DL
CO2 SERPL-SCNC: 29 MMOL/L (ref 21–32)
CREAT SERPL-MCNC: 0.91 MG/DL (ref 0.6–1.3)
ERYTHROCYTE [DISTWIDTH] IN BLOOD BY AUTOMATED COUNT: 12.9 % (ref 11.6–14.5)
GLOBULIN SER CALC-MCNC: 3.7 G/DL (ref 2–4)
GLUCOSE SERPL-MCNC: 95 MG/DL (ref 74–99)
HCT VFR BLD AUTO: 42 % (ref 35–45)
HDLC SERPL-MCNC: 54 MG/DL (ref 40–60)
HDLC SERPL: 3.3 {RATIO} (ref 0–5)
HGB BLD-MCNC: 13.7 G/DL (ref 12–16)
LDLC SERPL CALC-MCNC: 82.6 MG/DL (ref 0–100)
LIPID PROFILE,FLP: ABNORMAL
MCH RBC QN AUTO: 30.1 PG (ref 24–34)
MCHC RBC AUTO-ENTMCNC: 32.6 G/DL (ref 31–37)
MCV RBC AUTO: 92.3 FL (ref 74–97)
PLATELET # BLD AUTO: 299 K/UL (ref 135–420)
PMV BLD AUTO: 10 FL (ref 9.2–11.8)
POTASSIUM SERPL-SCNC: 4.4 MMOL/L (ref 3.5–5.5)
PROT SERPL-MCNC: 7.5 G/DL (ref 6.4–8.2)
RBC # BLD AUTO: 4.55 M/UL (ref 4.2–5.3)
SODIUM SERPL-SCNC: 141 MMOL/L (ref 136–145)
TRIGL SERPL-MCNC: 217 MG/DL (ref ?–150)
VLDLC SERPL CALC-MCNC: 43.4 MG/DL
WBC # BLD AUTO: 7.7 K/UL (ref 4.6–13.2)

## 2021-04-22 PROCEDURE — 36415 COLL VENOUS BLD VENIPUNCTURE: CPT

## 2021-04-22 PROCEDURE — 85027 COMPLETE CBC AUTOMATED: CPT

## 2021-04-22 PROCEDURE — 80061 LIPID PANEL: CPT

## 2021-04-22 PROCEDURE — 80053 COMPREHEN METABOLIC PANEL: CPT

## 2021-04-25 NOTE — PROGRESS NOTES
77 y.o. WHITE female who presents for evaluation. We've not seen her in a year but she's doing well    No cardiovascular complaints, her bp has been controlled when she checks. She is back to doing line dancing up to twice a week although lately, she has been having problems with pain down the right leg as below    Denies polyuria, polydipsia, nocturia, vision change. Not checking sugars at this time. Weight is stable    Denies GI or Gu complaints. She sees Dr. Nola Barrios yearly, last DEXA 2018 by her report    Since February timeframe, she is been having a discomfort in the right buttock region sometimes associated with a numbing/tingly sensation in the right anterolateral calf area. No pain in the legs though, which is how her previous L-spine disease manifested that she ended up getting surgery for. No help with OTC nonsteroidals or Tylenol or ice packs. Denied any saddle complaints, incontinence, not really getting worse just not improving    LAST MEDICARE WELLNESS EXAM: 4/9/19, 4/28/20, 4/29/21    Past Medical History:   Diagnosis Date    Colon polyp 03/2017    Dr Kodi Morel    Degenerative arthritis of lumbar spine 03/2016    Dr Fermin Stovall in [facility]; Dr Eladio Daniel; Jenita Ready 7/16 showed severe multilvevel degen change, large right L4-5 synovial cyst w impingment of R L5 with moderate to severe central stenosis, severe L3-4, mod L2-3 central stenosis and L2-3 left, L3-4bilat, L4-5 left foraminal stenosis; sciatica    FHx: heart disease     Hyperlipidemia     calc risk score 1.9 from (1/12); taking statins due to strong FH    Hypertension 05/2019    IFG (impaired fasting glucose) 5/12    Macular degeneration     Dr. Anna Cano Osteopenia     DEXA t score spine 3.5, hip -2.0 (5/12); 0.9 spine, -2.0 hip FRAX 10/1.4 (8/14);  4.5 spine, -2.1 hip (10/16) Dr Nola Barrios; Fosamax [id number]    Overweight (BMI 25.0-29. 9) 4/5/2018    Skin cancer     SCC and BCC    Varicose vein of leg     Zoster 1980s     Past Surgical History:   Procedure Laterality Date    HX BLADDER SUSPENSION  4/2006    Dr. Ramu Llanes Bilateral 05/2019    Dr Maykel Diaz Pain 2006 hemorrhoids; 3/17 polyp    HX [address], 1988    HX HYSTERECTOMY  2001    later oophorectomy    HX LUMBAR LAMINECTOMY  11/2016    partial RIGHT L4-5 laminectomy for resection of large synovial cyst Dr Villasenor Sos History    Marital status:      Spouse name: Not on file    Number of children: 1    Years of education: Not on file    Highest education level: Not on file   Occupational History    Occupation:    Social Needs    Financial resource strain: Not on file    Food insecurity     Worry: Not on file     Inability: Not on file   [employer] needs     Medical: Not on file     Non-medical: Not on file   Tobacco Use    Smoking status: Never Smoker    Smokeless tobacco: Never Used   Substance and Sexual Activity    Alcohol use: Yes     Alcohol/week: 0.0 standard drinks     Comment: social    Drug use: No    Sexual activity: Not Currently   Lifestyle    Physical activity     Days per week: Not on file     Minutes per session: Not on file    Stress: Not on file   Relationships    Social connections     Talks on phone: Not on file     Gets together: Not on file     Attends Worship service: Not on file     Active member of club or organization: Not on file     Attends meetings of clubs or organizations: Not on file     Relationship status: Not on file    Intimate partner violence     Fear of current or ex partner: Not on file     Emotionally abused: Not on file     Physically abused: Not on file     Forced sexual activity: Not on file   Other Topics Concern    Not on file   Social History Narrative    Not on file     Current Outpatient Medications   Medication Sig    OTHER two (2) days a week.  Revaee    estradioL 0.025 mg/24 hr ptsw by TransDERmal route two (2) days a week.  methylPREDNISolone (MEDROL DOSEPACK) 4 mg tablet Per dose pack instructions    amLODIPine (NORVASC) 5 mg tablet Take 1 Tab by mouth daily.  atorvastatin (LIPITOR) 10 mg tablet Take 1 Tab by mouth daily.  ANTIOX #8/OM3/DHA/EPA/LUT/ZEAX (PRESERVISION AREDS 2, OMEGA-3, PO) Take  by mouth.  conjugated estrogens (PREMARIN) 0.625 mg/gram vaginal cream Insert 0.5 g into vagina daily.  clobetasol (TEMOVATE) 0.05 % ointment Apply  to affected area two (2) times a day.  calcium-cholecalciferol, d3, (CALCIUM 600 + D) 600-125 mg-unit Tab Take  by mouth.  MULTIVITAMIN PO Take  by mouth. No current facility-administered medications for this visit. Allergies   Allergen Reactions    Qsymia [Phentermine-Topiramate] Other (comments)     Insomnia       REVIEW OF SYSTEMS:  Dr. Cristine Montenegro 2020, mammo 9/20, DEXA 2018, colo 3/17 Dr Ema Tirado  Ophtho  no vision change or eye pain  Oral  no mouth pain, tongue or tooth problems  Ears  no hearing loss, ear pain, fullness, no swallowing problems  Cardiac  no CP, PND, orthopnea, edema, palpitations or syncope  Chest  no breast masses  Resp  no wheezing, chronic coughing, dyspnea  GI  no heartburn, nausea, vomiting, change in bowel habits, bleeding, hemorrhoids  Urinary  no dysuria, hematuria, flank pain, urgency, frequency    Visit Vitals  /82   Pulse 79   Temp 97.3 °F (36.3 °C) (Temporal)   Resp 14   Ht 5' 3\" (1.6 m)   Wt 151 lb (68.5 kg)   SpO2 99%   BMI 26.75 kg/m²   A&O x3  Affect is appropriate. Mood stable  No apparent distress  Anicteric, no JVD, adenopathy or thyromegaly. No carotid bruits or radiated murmur  Lungs clear to auscultation, no wheezes or rales  Heart showed regular rate and rhythm. No murmur, rubs, gallops  Abdomen soft nontender, no hepatosplenomegaly or masses. Extremities without edema.   Pulses 1-2+ symmetrically  Equivocal straight leg on right, no pain on rotation of the hip or palpation of trochanter; strength, sensation, pulses normal    LABS  From 1/12 showed   gluc 94,   cr 0.74, gfr 90,  alt 17,                   ldl-p 2079, chol 246, tg 154, hdl 53, ldl-c 162, wbc 5.0, hb 12.4, plt 275  From 5/12 showed   gluc 106, cr 0.82                         hba1c 5.8, ldl-p 1162, chol 159, tg 150, hdl 49, ldl-c 80,         tsh 1.35  From 11/12 showed gluc 100, cr 0.89                         hba1c 5.8, ldl-p 976,   chol 152, tg 167, hdl 49, ldl-c 70  From 5/13 showed                   hba1c 5.8,                   chol 165, tg 111, hdl 54, ldl-c 89  From 12/13 showed gluc 99,   cr 0.92, gfr 68,  alt 12, hba1c 5.7,               chol 155, tg 115, hdl 51, ldl-c 81  From 6/14 showed   gluc 92,   cr 1.03, gfr 55,  alt 13, hba1c 5.6,     chol 161, tg 98,   hdl 54, ldl-c 87,  wbc 5.6, hb 13.6, plt 257, tsh 1.09  From 12/14 showed        hba1c 5.8,     chol 158, tg 108, hdl 64, ldl-c 72  From 6/15 showed   gluc 98,   cr 0.89, gfr>60, alt 10, hba1c 5.9,     chol 151, tg 81,   hdl 56, ldl-c 79  From 12/15 showed        hba1c 5.6,     chol 161, tg 141, hdl 68, ldl-c 65  From 6/16 showed   gluc 99,   cr 0.88, gfr>60,   hba1c 5.8,               wbc 5.6, hb 12.9, plt 252, hep c neg  From 11/16 showed gluc 93,   cr 0.75, gfr>60,                 wbc 6.1, hb 12.1, plt 247  From 12/16 showed        hba1c 5.0,     chol 181, tg 119, hdl 76, ldl-c 81  From 7/17 showed        hba1c 5.5,     chol 173, tg 203, hdl 60, ldl-c 72  From 3/18 showed   gluc 96,   cr 0.80, gfr>60, alt 23,                wbc 6.4, hb 13.3, plt 251  From 4/19 showed   gluc 87,   cr 0.79, gfr>60, alt 26,      chol 167, tg 148, hdl 67, ldl-c 70,  wbc 5.9, hb 12.9, plt 275, vit d 40.6  From 4/20 showed   gluc 92,   cr 0.86, gfr>60, alt 25,      chol 160, tg 168, hdl 62, ldl-c 64,  wbc 7.4, hb 13.5, plt 295  From 4/21 showed   gluc 95,   cr 0.91, gfr>60, alt 28, hba1c 5.4,     chol 180, tg 217, hdl 54, ldl-c 89,  wbc 7.7, hb 13.7, plt 299    We reviewed the patient's labs from the last several visits to point out trends in the numbers          Patient Active Problem List   Diagnosis Code    Osteopenia FRAX 8/14 10 and 1.4 M85.80    Hyperlipidemia E78.5    Macular degeneration Dr. Marie June H35.30    IFG (impaired fasting glucose) R73.01    Arthritis, degenerative M19.90    GERD without esophagitis K21.9    Advance directive in chart Z78.9    Overweight (BMI 25.0-29. 9) E66.3    Primary hypertension I10     ASSESSMENT AND PLAN  1.  DJD. Prn nsaids  2. AMD.  F/U Dr. Marie June  3. GERD. Prn PPI and avoidance measures  4. Osteopenia. Ca/D/wt bearing exercises. Per Dr Pilar Tanner  5. Hyperlipidemia and strong FH chd.  Continue lipitor   6. IFG. Weight loss, continue dietary and lifestyle measures  7. Colon polyp. Fiber, colo 2022 Dr Elvira Antony  8. Overweight. Lifestyle and dietary measures. Portion control reiterated. 9.  HTN. Continue current regimen. 10.  Ortho. Medrol dose pack given; hack to Dr Adelfo Moore group if no better      RTC 4/22    Above conditions discussed at length and patient vocalized understanding.   All questions answered to patient satisfaction

## 2021-04-25 NOTE — PROGRESS NOTES
This is a subsequent Medicare Annual Wellness Exam    I have reviewed the patient's medical history in detail and updated the computerized patient record. Assessment/Plan   Education and counseling provided:  Are appropriate based on today's review and evaluation  End-of-Life planning (with patient's consent)  Influenza Vaccine  Screening Mammography  Colorectal cancer screening tests  Cardiovascular screening blood test  Bone mass measurement (DEXA)  Diabetes screening test    1. Medicare annual wellness visit, subsequent  2. IFG (impaired fasting glucose)  -     AMB POC HEMOGLOBIN H8E  -     METABOLIC PANEL, COMPREHENSIVE; Future  -     HEMOGLOBIN A1C WITH EAG; Future  3. Right sided sciatica  -     methylPREDNISolone (MEDROL DOSEPACK) 4 mg tablet; Per dose pack instructions, Normal, Disp-1 Dose Pack, R-0  4. Primary hypertension  -     amLODIPine (NORVASC) 5 mg tablet; Take 1 Tab by mouth daily. , Normal, Disp-90 Tab, R-3  5. Hyperlipidemia, unspecified hyperlipidemia type  -     atorvastatin (LIPITOR) 10 mg tablet; Take 1 Tab by mouth daily. , Normal, Disp-90 Tab, R-3  -     CBC W/O DIFF; Future  -     LIPID PANEL; Future  6. GERD without esophagitis  7. Osteoarthritis, unspecified osteoarthritis type, unspecified site  8. Osteopenia, unspecified location  9. Overweight (BMI 25.0-29.9)  10. Advanced directives, counseling/discussion       Depression Risk Factor Screening     3 most recent PHQ Screens 4/29/2021   Little interest or pleasure in doing things Not at all   Feeling down, depressed, irritable, or hopeless Not at all   Total Score PHQ 2 0       Alcohol Risk Screen    Do you average more than 1 drink per night or more than 7 drinks a week:  No    On any one occasion in the past three months have you have had more than 3 drinks containing alcohol:  No        Functional Ability and Level of Safety    Hearing: Hearing is good. Activities of Daily Living:   The home contains: no safety equipment. Patient does total self care      Ambulation: with no difficulty     Fall Risk:  Fall Risk Assessment, last 12 mths 4/29/2021   Able to walk? Yes   Fall in past 12 months? 0   Do you feel unsteady? 0   Are you worried about falling 0      Abuse Screen:  Patient is not abused       Cognitive Screening    Has your family/caregiver stated any concerns about your memory: no       Health Maintenance Due     There are no preventive care reminders to display for this patient. Patient Care Team   Patient Care Team:  Ting Rey MD as PCP - General (Internal Medicine)  Ting Rey MD as PCP - [address] [place] [address] Dr Light Provider    History     Patient Active Problem List   Diagnosis Code    Osteopenia FRAX 8/14 10 and 1.4 M85.80    Hyperlipidemia E78.5    Macular degeneration Dr. Vicki Ontiveros H35.30    IFG (impaired fasting glucose) R73.01    Arthritis, degenerative M19.90    GERD without esophagitis K21.9    Advance directive in chart Z78.9    Overweight (BMI 25.0-29. 9) E66.3    Primary hypertension I10     Past Medical History:   Diagnosis Date    Colon polyp 03/2017    Dr Amy Meier    Degenerative arthritis of lumbar spine 03/2016    Dr Blair Clarke in [facility]; Dr Tripp Garcia; Baylee Callander 7/16 showed severe multilvevel degen change, large right L4-5 synovial cyst w impingment of R L5 with moderate to severe central stenosis, severe L3-4, mod L2-3 central stenosis and L2-3 left, L3-4bilat, L4-5 left foraminal stenosis; sciatica    FHx: heart disease     Hyperlipidemia     calc risk score 1.9 from (1/12); taking statins due to strong FH    Hypertension 05/2019    IFG (impaired fasting glucose) 5/12    Macular degeneration     Dr. Sea Hannon Osteopenia     DEXA t score spine 3.5, hip -2.0 (5/12); 0.9 spine, -2.0 hip FRAX 10/1.4 (8/14);  4.5 spine, -2.1 hip (10/16) Dr Deana Rocha; Fosamax [id number]    Overweight (BMI 25.0-29. 9) 4/5/2018    Skin cancer     SCC and BCC    Varicose vein of leg     Zoster 1980s      Past Surgical History:   Procedure Laterality Date    HX BLADDER SUSPENSION  4/2006    Dr. Mack Arguello Bilateral 05/2019    Dr Calhoun Gibney [place] 2006 hemorrhoids; 3/17 polyp    [address]X [address], 1988    HX HYSTERECTOMY  2001    later oophorectomy    HX LUMBAR LAMINECTOMY  11/2016    partial RIGHT L4-5 laminectomy for resection of large synovial cyst Dr Radha Calvo     Current Outpatient Medications   Medication Sig Dispense Refill    OTHER two (2) days a week. Revaee      estradioL 0.025 mg/24 hr ptsw by TransDERmal route two (2) days a week.  methylPREDNISolone (MEDROL DOSEPACK) 4 mg tablet Per dose pack instructions 1 Dose Pack 0    amLODIPine (NORVASC) 5 mg tablet Take 1 Tab by mouth daily. 90 Tab 3    atorvastatin (LIPITOR) 10 mg tablet Take 1 Tab by mouth daily. 90 Tab 3    ANTIOX #8/OM3/DHA/EPA/LUT/ZEAX (PRESERVISION AREDS 2, OMEGA-3, PO) Take  by mouth.  conjugated estrogens (PREMARIN) 0.625 mg/gram vaginal cream Insert 0.5 g into vagina daily.  clobetasol (TEMOVATE) 0.05 % ointment Apply  to affected area two (2) times a day.  calcium-cholecalciferol, d3, (CALCIUM 600 + D) 600-125 mg-unit Tab Take  by mouth.  MULTIVITAMIN PO Take  by mouth.  ESTROGENS,CONJ/BAZEDOXIFENE (DUAVEE PO) Take  by mouth. Indications: NOT TAKING       Allergies   Allergen Reactions    Qsymia [Phentermine-Topiramate] Other (comments)     Insomnia         Family History   Problem Relation Age of Onset    Heart Disease Mother     Other Father         ESRD    Heart Disease Father     Heart Disease Brother     Elevated Lipids Brother     Diabetes Brother     Cancer Maternal Aunt 54        breast    Diabetes Other         maternal cousin breast     Social History     Tobacco Use    Smoking status: Never Smoker    Smokeless tobacco: Never Used   Substance Use Topics    Alcohol use:  Yes     Alcohol/week: 0.0 standard drinks     Comment: social Roxy Casiano MD

## 2021-04-29 ENCOUNTER — OFFICE VISIT (OUTPATIENT)
Dept: INTERNAL MEDICINE CLINIC | Age: 66
End: 2021-04-29
Payer: MEDICARE

## 2021-04-29 VITALS
HEART RATE: 79 BPM | WEIGHT: 151 LBS | DIASTOLIC BLOOD PRESSURE: 82 MMHG | SYSTOLIC BLOOD PRESSURE: 128 MMHG | BODY MASS INDEX: 26.75 KG/M2 | TEMPERATURE: 97.3 F | HEIGHT: 63 IN | OXYGEN SATURATION: 99 % | RESPIRATION RATE: 14 BRPM

## 2021-04-29 DIAGNOSIS — M54.31 RIGHT SIDED SCIATICA: ICD-10-CM

## 2021-04-29 DIAGNOSIS — M85.80 OSTEOPENIA, UNSPECIFIED LOCATION: ICD-10-CM

## 2021-04-29 DIAGNOSIS — K21.9 GERD WITHOUT ESOPHAGITIS: ICD-10-CM

## 2021-04-29 DIAGNOSIS — E66.3 OVERWEIGHT (BMI 25.0-29.9): ICD-10-CM

## 2021-04-29 DIAGNOSIS — I10 PRIMARY HYPERTENSION: ICD-10-CM

## 2021-04-29 DIAGNOSIS — E78.5 HYPERLIPIDEMIA, UNSPECIFIED HYPERLIPIDEMIA TYPE: ICD-10-CM

## 2021-04-29 DIAGNOSIS — Z71.89 ADVANCED DIRECTIVES, COUNSELING/DISCUSSION: ICD-10-CM

## 2021-04-29 DIAGNOSIS — R73.01 IFG (IMPAIRED FASTING GLUCOSE): ICD-10-CM

## 2021-04-29 DIAGNOSIS — M19.90 OSTEOARTHRITIS, UNSPECIFIED OSTEOARTHRITIS TYPE, UNSPECIFIED SITE: ICD-10-CM

## 2021-04-29 DIAGNOSIS — Z00.00 MEDICARE ANNUAL WELLNESS VISIT, SUBSEQUENT: Primary | ICD-10-CM

## 2021-04-29 LAB — HBA1C MFR BLD HPLC: 5.4 %

## 2021-04-29 PROCEDURE — 1090F PRES/ABSN URINE INCON ASSESS: CPT | Performed by: INTERNAL MEDICINE

## 2021-04-29 PROCEDURE — G8752 SYS BP LESS 140: HCPCS | Performed by: INTERNAL MEDICINE

## 2021-04-29 PROCEDURE — G8427 DOCREV CUR MEDS BY ELIG CLIN: HCPCS | Performed by: INTERNAL MEDICINE

## 2021-04-29 PROCEDURE — G0463 HOSPITAL OUTPT CLINIC VISIT: HCPCS | Performed by: INTERNAL MEDICINE

## 2021-04-29 PROCEDURE — G9899 SCRN MAM PERF RSLTS DOC: HCPCS | Performed by: INTERNAL MEDICINE

## 2021-04-29 PROCEDURE — G8419 CALC BMI OUT NRM PARAM NOF/U: HCPCS | Performed by: INTERNAL MEDICINE

## 2021-04-29 PROCEDURE — 99214 OFFICE O/P EST MOD 30 MIN: CPT | Performed by: INTERNAL MEDICINE

## 2021-04-29 PROCEDURE — 83036 HEMOGLOBIN GLYCOSYLATED A1C: CPT | Performed by: INTERNAL MEDICINE

## 2021-04-29 PROCEDURE — G8399 PT W/DXA RESULTS DOCUMENT: HCPCS | Performed by: INTERNAL MEDICINE

## 2021-04-29 PROCEDURE — 99497 ADVNCD CARE PLAN 30 MIN: CPT | Performed by: INTERNAL MEDICINE

## 2021-04-29 PROCEDURE — 3017F COLORECTAL CA SCREEN DOC REV: CPT | Performed by: INTERNAL MEDICINE

## 2021-04-29 PROCEDURE — G8536 NO DOC ELDER MAL SCRN: HCPCS | Performed by: INTERNAL MEDICINE

## 2021-04-29 PROCEDURE — G8510 SCR DEP NEG, NO PLAN REQD: HCPCS | Performed by: INTERNAL MEDICINE

## 2021-04-29 PROCEDURE — G8754 DIAS BP LESS 90: HCPCS | Performed by: INTERNAL MEDICINE

## 2021-04-29 PROCEDURE — 1101F PT FALLS ASSESS-DOCD LE1/YR: CPT | Performed by: INTERNAL MEDICINE

## 2021-04-29 PROCEDURE — G0439 PPPS, SUBSEQ VISIT: HCPCS | Performed by: INTERNAL MEDICINE

## 2021-04-29 RX ORDER — METHYLPREDNISOLONE 4 MG/1
TABLET ORAL
Qty: 1 DOSE PACK | Refills: 0 | Status: SHIPPED | OUTPATIENT
Start: 2021-04-29 | End: 2022-05-03 | Stop reason: ALTCHOICE

## 2021-04-29 RX ORDER — ESTRADIOL 0.03 MG/D
FILM, EXTENDED RELEASE TRANSDERMAL
COMMUNITY

## 2021-04-29 RX ORDER — AMLODIPINE BESYLATE 5 MG/1
5 TABLET ORAL DAILY
Qty: 90 TAB | Refills: 3 | Status: SHIPPED | OUTPATIENT
Start: 2021-04-29 | End: 2022-05-10 | Stop reason: SDUPTHER

## 2021-04-29 RX ORDER — ATORVASTATIN CALCIUM 10 MG/1
10 TABLET, FILM COATED ORAL DAILY
Qty: 90 TAB | Refills: 3 | Status: SHIPPED | OUTPATIENT
Start: 2021-04-29 | End: 2022-05-10 | Stop reason: SDUPTHER

## 2021-04-29 NOTE — ACP (ADVANCE CARE PLANNING)
Advance Care Planning     General Advance Care Planning (ACP) Conversation      Date of Conversation: 4/29/2021  Conducted with: Patient with Decision Making Capacity    Healthcare Decision Maker:     Click here to complete [employer] including selection of the [employer] Relationship (ie \"Primary\")  Today we documented Decision Maker(s) consistent with Legal Next of Kin hierarchy. Content/Action Overview:    Has ACP document(s) on file - reflects the patient's care preferences  Reviewed DNR/DNI and patient elects Full Code (Attempt Resuscitation)  Topics discussed: ventilation preferences, hospitalization preferences and resuscitation preferences  Additional Comments: none     Length of Voluntary ACP Conversation in minutes:  16 minutes    David Portillo MD

## 2021-04-29 NOTE — PATIENT INSTRUCTIONS

## 2021-04-29 NOTE — PROGRESS NOTES
Frantz Prabhakar presents today for   Chief Complaint   Patient presents with    Annual Wellness Visit    Cholesterol Problem     f/u with labs              Depression Screening:  3 most recent PHQ Screens 5/13/2019   Little interest or pleasure in doing things Not at all   Feeling down, depressed, irritable, or hopeless Not at all   Total Score PHQ 2 0       Learning Assessment:  Learning Assessment 6/11/2014   PRIMARY LEARNER Patient   HIGHEST LEVEL OF EDUCATION - PRIMARY LEARNER  SOME COLLEGE   BARRIERS PRIMARY LEARNER Siobhan Smith 33 CAREGIVER No   PRIMARY LANGUAGE ENGLISH   LEARNER PREFERENCE PRIMARY DEMONSTRATION   ANSWERED BY patient   RELATIONSHIP SELF       Abuse Screening:  Abuse Screening Questionnaire 4/9/2019   Do you ever feel afraid of your partner? N   Are you in a relationship with someone who physically or mentally threatens you? N   Is it safe for you to go home? Y       Fall Risk  Fall Risk Assessment, last 12 mths 5/13/2019   Able to walk? Yes   Fall in past 12 months? No           Coordination of Care:  1. Have you been to the ER, urgent care clinic since your last visit? Hospitalized since your last visit? no    2. Have you seen or consulted any other health care providers outside of the [address] [place] [address] since your last visit? Include any pap smears or colon screening.  no

## 2021-09-15 ENCOUNTER — TELEPHONE (OUTPATIENT)
Dept: INTERNAL MEDICINE CLINIC | Age: 66
End: 2021-09-15

## 2021-09-15 DIAGNOSIS — N30.00 ACUTE CYSTITIS WITHOUT HEMATURIA: Primary | ICD-10-CM

## 2021-09-15 RX ORDER — SULFAMETHOXAZOLE AND TRIMETHOPRIM 800; 160 MG/1; MG/1
1 TABLET ORAL 2 TIMES DAILY
Qty: 10 TABLET | Refills: 0 | Status: SHIPPED | OUTPATIENT
Start: 2021-09-15 | End: 2022-05-10 | Stop reason: SDUPTHER

## 2021-09-15 NOTE — TELEPHONE ENCOUNTER
Pt calling because she has a UTI and she would like Dr. Benjamin Myers to call her in a prescription to [facility] in [facility], [address] [facility]. Please advise.  Thank you!!!

## 2021-09-24 ENCOUNTER — TRANSCRIBE ORDER (OUTPATIENT)
Dept: SCHEDULING | Age: 66
End: 2021-09-24

## 2021-09-24 DIAGNOSIS — Z12.31 VISIT FOR SCREENING MAMMOGRAM: Primary | ICD-10-CM

## 2021-11-10 ENCOUNTER — HOSPITAL ENCOUNTER (OUTPATIENT)
Dept: LAB | Age: 66
Discharge: HOME | End: 2021-11-10
Payer: MEDICARE

## 2021-11-10 ENCOUNTER — TELEPHONE (OUTPATIENT)
Dept: INTERNAL MEDICINE CLINIC | Age: 66
End: 2021-11-10

## 2021-11-10 ENCOUNTER — APPOINTMENT (OUTPATIENT)
Dept: INTERNAL MEDICINE CLINIC | Age: 66
End: 2021-11-10

## 2021-11-10 DIAGNOSIS — R30.0 DYSURIA: ICD-10-CM

## 2021-11-10 DIAGNOSIS — R30.0 DYSURIA: Primary | ICD-10-CM

## 2021-11-10 LAB
BILIRUB UR QL STRIP: ABNORMAL
GLUCOSE UR-MCNC: NEGATIVE MG/DL
KETONES P FAST UR STRIP-MCNC: ABNORMAL MG/DL
PH UR STRIP: 5.5 [PH] (ref 4.6–8)
PROT UR QL STRIP: ABNORMAL
SP GR UR STRIP: 1.03 (ref 1–1.03)
UA UROBILINOGEN AMB POC: ABNORMAL (ref 0.2–1)
URINALYSIS CLARITY POC: CLEAR
URINALYSIS COLOR POC: ABNORMAL
URINE BLOOD POC: ABNORMAL
URINE LEUKOCYTES POC: ABNORMAL
URINE NITRITES POC: NEGATIVE

## 2021-11-10 PROCEDURE — 87186 SC STD MICRODIL/AGAR DIL: CPT

## 2021-11-10 PROCEDURE — 87086 URINE CULTURE/COLONY COUNT: CPT

## 2021-11-10 PROCEDURE — 87077 CULTURE AEROBIC IDENTIFY: CPT

## 2021-11-10 RX ORDER — CEPHALEXIN 500 MG/1
500 CAPSULE ORAL 2 TIMES DAILY
Qty: 14 CAPSULE | Refills: 0 | Status: SHIPPED | OUTPATIENT
Start: 2021-11-10 | End: 2022-05-03 | Stop reason: ALTCHOICE

## 2021-11-10 NOTE — TELEPHONE ENCOUNTER
Patient reached and advised to come to the office to leave a urine sample for testing (she denied any fever, cough, SOB, congestion), patient stated she would come to the office at approximately 1:00 pm today.

## 2021-11-10 NOTE — TELEPHONE ENCOUNTER
Patient came in today to leave a urine sample for POC urine dip and culture, the results were very abnormal, could you please review and send antibiotic if appropriate    Recent Results (from the past 12 hour(s))   AMB POC URINALYSIS DIP STICK AUTO W/ MICRO    Collection Time: 11/10/21  1:34 PM   Result Value Ref Range    Color (UA POC) Dark Yellow     Clarity (UA POC) Clear     Glucose (UA POC) Negative Negative    Bilirubin (UA POC) 1+ Negative    Ketones (UA POC) Trace Negative    Specific gravity (UA POC) 1.030 1.001 - 1.035    Blood (UA POC) 3+ Negative    pH (UA POC) 5.5 4.6 - 8.0    Protein (UA POC) 3+ Negative    Urobilinogen (UA POC) 0.2 mg/dL 0.2 - 1    Nitrites (UA POC) Negative Negative    Leukocyte esterase (UA POC) 3+ Negative

## 2021-11-10 NOTE — TELEPHONE ENCOUNTER
Pt called said she has a UTI- having urgency , A lot of discomfort and body chills she is asking if something could be called into   [facility] [address] [place]

## 2021-11-10 NOTE — TELEPHONE ENCOUNTER
Keflex course prescribed for presumed UTI given her history of urgency symptoms and most recent UA results showing positive leukocyte esterase.     Dr. Tamir Worley  [facility] [address]  Phone: [phone]  Fax: [phone]

## 2021-11-13 LAB
BACTERIA SPEC CULT: ABNORMAL
CC UR VC: ABNORMAL
SERVICE CMNT-IMP: ABNORMAL

## 2021-12-09 ENCOUNTER — HOSPITAL ENCOUNTER (OUTPATIENT)
Dept: MAMMOGRAPHY | Age: 66
Discharge: HOME | End: 2021-12-09
Attending: INTERNAL MEDICINE
Payer: MEDICARE

## 2021-12-09 ENCOUNTER — TELEPHONE (OUTPATIENT)
Dept: INTERNAL MEDICINE CLINIC | Age: 66
End: 2021-12-09

## 2021-12-09 DIAGNOSIS — R92.8 ABNORMAL MAMMOGRAM: ICD-10-CM

## 2021-12-09 DIAGNOSIS — Z12.31 VISIT FOR SCREENING MAMMOGRAM: ICD-10-CM

## 2021-12-09 DIAGNOSIS — R92.8 ABNORMAL MAMMOGRAM: Primary | ICD-10-CM

## 2021-12-09 PROCEDURE — 77063 BREAST TOMOSYNTHESIS BI: CPT

## 2021-12-16 DIAGNOSIS — R92.8 ABNORMAL MAMMOGRAM: ICD-10-CM

## 2021-12-29 ENCOUNTER — HOSPITAL ENCOUNTER (OUTPATIENT)
Dept: MAMMOGRAPHY | Age: 66
Discharge: HOME | End: 2021-12-29
Attending: INTERNAL MEDICINE
Payer: MEDICARE

## 2021-12-29 ENCOUNTER — HOSPITAL ENCOUNTER (OUTPATIENT)
Dept: ULTRASOUND IMAGING | Age: 66
Discharge: HOME | End: 2021-12-29
Attending: INTERNAL MEDICINE
Payer: MEDICARE

## 2021-12-29 DIAGNOSIS — R92.8 ABNORMAL MAMMOGRAM: ICD-10-CM

## 2021-12-29 PROCEDURE — 77061 BREAST TOMOSYNTHESIS UNI: CPT

## 2021-12-29 PROCEDURE — 76642 ULTRASOUND BREAST LIMITED: CPT

## 2022-03-19 PROBLEM — E66.3 OVERWEIGHT (BMI 25.0-29.9): Status: ACTIVE | Noted: 2018-04-05

## 2022-03-19 PROBLEM — I10 PRIMARY HYPERTENSION: Status: ACTIVE | Noted: 2019-05-13

## 2022-04-26 ENCOUNTER — APPOINTMENT (OUTPATIENT)
Dept: INTERNAL MEDICINE CLINIC | Age: 67
End: 2022-04-26

## 2022-04-26 ENCOUNTER — HOSPITAL ENCOUNTER (OUTPATIENT)
Dept: LAB | Age: 67
Discharge: HOME | End: 2022-04-26
Payer: MEDICARE

## 2022-04-26 DIAGNOSIS — E78.5 HYPERLIPIDEMIA, UNSPECIFIED HYPERLIPIDEMIA TYPE: ICD-10-CM

## 2022-04-26 DIAGNOSIS — R73.01 IFG (IMPAIRED FASTING GLUCOSE): ICD-10-CM

## 2022-04-26 LAB
ALBUMIN SERPL-MCNC: 3.7 G/DL (ref 3.4–5)
ALBUMIN/GLOB SERPL: 1.1 {RATIO} (ref 0.8–1.7)
ALP SERPL-CCNC: 72 U/L (ref 45–117)
ALT SERPL-CCNC: 29 U/L (ref 13–56)
ANION GAP SERPL CALC-SCNC: 4 MMOL/L (ref 3–18)
AST SERPL-CCNC: 22 U/L (ref 10–38)
BILIRUB SERPL-MCNC: 1 MG/DL (ref 0.2–1)
BUN SERPL-MCNC: 19 MG/DL (ref 7–18)
BUN/CREAT SERPL: 25 (ref 12–20)
CALCIUM SERPL-MCNC: 9.3 MG/DL (ref 8.5–10.1)
CHLORIDE SERPL-SCNC: 107 MMOL/L (ref 100–111)
CHOLEST SERPL-MCNC: 156 MG/DL
CO2 SERPL-SCNC: 32 MMOL/L (ref 21–32)
CREAT SERPL-MCNC: 0.77 MG/DL (ref 0.6–1.3)
ERYTHROCYTE [DISTWIDTH] IN BLOOD BY AUTOMATED COUNT: 12.9 % (ref 11.6–14.5)
EST. AVERAGE GLUCOSE BLD GHB EST-MCNC: 105 MG/DL
GLOBULIN SER CALC-MCNC: 3.3 G/DL (ref 2–4)
GLUCOSE SERPL-MCNC: 93 MG/DL (ref 74–99)
HBA1C MFR BLD: 5.3 % (ref 4.2–5.6)
HCT VFR BLD AUTO: 40.8 % (ref 35–45)
HDLC SERPL-MCNC: 60 MG/DL (ref 40–60)
HDLC SERPL: 2.6 {RATIO} (ref 0–5)
HGB BLD-MCNC: 13 G/DL (ref 12–16)
LDLC SERPL CALC-MCNC: 73 MG/DL (ref 0–100)
LIPID PROFILE,FLP: NORMAL
MCH RBC QN AUTO: 29.3 PG (ref 24–34)
MCHC RBC AUTO-ENTMCNC: 31.9 G/DL (ref 31–37)
MCV RBC AUTO: 92.1 FL (ref 78–100)
NRBC # BLD: 0 K/UL (ref 0–0.01)
NRBC BLD-RTO: 0 PER 100 WBC
PLATELET # BLD AUTO: 282 K/UL (ref 135–420)
PMV BLD AUTO: 10.2 FL (ref 9.2–11.8)
POTASSIUM SERPL-SCNC: 3.9 MMOL/L (ref 3.5–5.5)
PROT SERPL-MCNC: 7 G/DL (ref 6.4–8.2)
RBC # BLD AUTO: 4.43 M/UL (ref 4.2–5.3)
SODIUM SERPL-SCNC: 143 MMOL/L (ref 136–145)
TRIGL SERPL-MCNC: 115 MG/DL (ref ?–150)
VLDLC SERPL CALC-MCNC: 23 MG/DL
WBC # BLD AUTO: 6.6 K/UL (ref 4.6–13.2)

## 2022-04-26 PROCEDURE — 85027 COMPLETE CBC AUTOMATED: CPT

## 2022-04-26 PROCEDURE — 80061 LIPID PANEL: CPT

## 2022-04-26 PROCEDURE — 36415 COLL VENOUS BLD VENIPUNCTURE: CPT

## 2022-04-26 PROCEDURE — 83036 HEMOGLOBIN GLYCOSYLATED A1C: CPT

## 2022-04-26 PROCEDURE — 80053 COMPREHEN METABOLIC PANEL: CPT

## 2022-05-04 NOTE — PROGRESS NOTES
This is a subsequent Medicare Annual Wellness Exam    I have reviewed the patient's medical history in detail and updated the computerized patient record. Assessment/Plan   Education and counseling provided:  Are appropriate based on today's review and evaluation  Influenza Vaccine  Screening Mammography  Colorectal cancer screening tests  Cardiovascular screening blood test  Bone mass measurement (DEXA)  Diabetes screening test    1. GERD without esophagitis  2. Hyperlipidemia, unspecified hyperlipidemia type  -     atorvastatin (LIPITOR) 10 mg tablet; Take 1 Tablet by mouth daily. , Normal, Disp-90 Tablet, R-3  -     LIPID PANEL; Future  3. Primary hypertension  -     amLODIPine (NORVASC) 5 mg tablet; Take 1 Tablet by mouth daily. , Normal, Disp-90 Tablet, R-3  -     CBC W/O DIFF; Future  4. Acute cystitis without hematuria  -     trimethoprim-sulfamethoxazole (BACTRIM DS, SEPTRA DS) 160-800 mg per tablet; Take 1 Tablet by mouth two (2) times a day for 5 days. , Normal, Disp-10 Tablet, R-0  5. IFG (impaired fasting glucose)  -     METABOLIC PANEL, COMPREHENSIVE; Future  -     HEMOGLOBIN A1C WITH EAG; Future  6. Macular degeneration, unspecified laterality, unspecified type  7. Lumbar radiculopathy  8. Medicare annual wellness visit, subsequent       Depression Risk Factor Screening     3 most recent PHQ Screens 5/10/2022   Little interest or pleasure in doing things Not at all   Feeling down, depressed, irritable, or hopeless Not at all   Total Score PHQ 2 0       Alcohol & Drug Abuse Risk Screen    Do you average more than 1 drink per night or more than 7 drinks a week:  No    On any one occasion in the past three months have you have had more than 3 drinks containing alcohol:  No          Functional Ability and Level of Safety    Hearing: Hearing is good. Activities of Daily Living: The home contains: no safety equipment.   Patient does total self care      Ambulation: with no difficulty     Fall Risk:  Fall Risk Assessment, last 12 mths 5/10/2022   Able to walk? Yes   Fall in past 12 months? 0   Do you feel unsteady? 0   Are you worried about falling 0      Abuse Screen:  Patient is not abused       Cognitive Screening    Has your family/caregiver stated any concerns about your memory: no     Cognitive Screening: Normal - Mini Cog Test    Health Maintenance Due   There are no preventive care reminders to display for this patient. Patient Care Team   Patient Care Team:  Osvaldo Lawrence MD as PCP - General (Internal Medicine Physician)  Osvaldo Lawrence MD as PCP - [facility] Empaneled Provider    History     Patient Active Problem List   Diagnosis Code    Osteopenia FRAX 8/14 10 and 1.4 M85.80    Hyperlipidemia E78.5    Macular degeneration Dr. Jon Chang H35.30    IFG (impaired fasting glucose) R73.01    Arthritis, degenerative M19.90    GERD without esophagitis K21.9    Advance directive in chart Z78.9    Overweight (BMI 25.0-29. 9) E66.3    Primary hypertension I10     Past Medical History:   Diagnosis Date    Chronic pain     Dr Jose Martin Conn in [address] [place] [address] Colon polyp 03/2017    Dr Fowler [facility]    Degenerative arthritis of lumbar spine 03/2016    Dr Jovanna Beavers in [facility]; Dr Hernesto Martinez; [place] 7/16 showed severe multilvevel degen change, large right L4-5 synovial cyst w impingment of R L5 with moderate to severe central stenosis, severe L3-4, mod L2-3 central stenosis and L2-3 left, L3-4bilat, L4-5 left foraminal stenosis; sciatica    FHx: heart disease     Hyperlipidemia     calc risk score 1.9 from (1/12); taking statins due to strong FH    Hypertension 05/2019    IFG (impaired fasting glucose) 5/12    Macular degeneration     Dr. Gloria Ponce Osteopenia     DEXA t score spine 3.5, hip -2.0 (5/12); 0.9 spine, -2.0 hip FRAX 10/1.4 (8/14);  4.5 spine, -2.1 hip (10/16) Dr Maddie Cagle; Fosamax [id number]    Overweight (BMI 25.0-29. 9) 4/5/2018    Skin cancer     SCC and BCC    Varicose vein of leg     Zoster 1980s Past Surgical History:   Procedure Laterality Date    HX BLADDER SUSPENSION  4/2006    Dr. Duran Square Bilateral 05/2019     [place]      Dr Kit Wagner 2006 hemorrhoids; 3/17 polyp    HX [address], 1988    HX HYSTERECTOMY  2001    later oophorectomy    HX LUMBAR LAMINECTOMY  11/2016    partial RIGHT L4-5 laminectomy [facility] of [facility]     Current Outpatient Medications   Medication Sig Dispense Refill    atorvastatin (LIPITOR) 10 mg tablet Take 1 Tablet by mouth daily. 90 Tablet 3    amLODIPine (NORVASC) 5 mg tablet Take 1 Tablet by mouth daily. 90 Tablet 3    trimethoprim-sulfamethoxazole (BACTRIM DS, SEPTRA DS) 160-800 mg per tablet Take 1 Tablet by mouth two (2) times a day for 5 days. 10 Tablet 0    OTHER two (2) days a week. Revaee      estradioL 0.025 mg/24 hr ptsw by TransDERmal route two (2) days a week.  ANTIOX #8/OM3/DHA/EPA/LUT/ZEAX (PRESERVISION AREDS 2, OMEGA-3, PO) Take  by mouth.  conjugated estrogens (PREMARIN) 0.625 mg/gram vaginal cream Insert 0.5 g into vagina daily.  clobetasol (TEMOVATE) 0.05 % ointment Apply  to affected area two (2) times a day.  calcium-cholecalciferol, d3, (CALCIUM 600 + D) 600-125 mg-unit Tab Take  by mouth.  MULTIVITAMIN PO Take  by mouth. Allergies   Allergen Reactions    Qsymia [Phentermine-Topiramate] Other (comments)     Insomnia         Family History   Problem Relation Age of Onset    Heart Disease Mother     Other Father         ESRD    Heart Disease Father     Heart Disease Brother     Elevated Lipids Brother     Diabetes Brother     Cancer Maternal Aunt 54        breast    Diabetes Other         maternal cousin breast     Social History     Tobacco Use    Smoking status: Never Smoker    Smokeless tobacco: Never Used   Substance Use Topics    Alcohol use:  Yes     Alcohol/week: 0.0 standard drinks     Comment: social Minerva Barrett MD

## 2022-05-04 NOTE — PROGRESS NOTES
74 y.o. WHITE/NON- female who presents for evaluation. We've not seen her in a year     No cardiovascular complaints. She had to stop doing the line dancing due to the spine issues below. Remains active with doing chores and occasional walking    Denies polyuria, polydipsia, nocturia, vision change. Not checking sugars at this time. Weight is stable    Denies GI or Gu complaints. She sees Dr. Overton Later yearly, last DEXA 2018 by her report    She has been seeing pain management, Dr. Matt Allen, in [place]. She actually had neurosurgery opinion as well and she is not felt to be a surgical candidate. They are going to be placing a spinal cord stimulator apparently tomorrow to try to alleviate a lot of her symptoms in the leg. Interestingly, she was not tried on any gabapentin type medications. *LAST MEDICARE WELLNESS EXAM: 4/9/19, 4/28/20, 4/29/21, 5/10/22    Past Medical History:   Diagnosis Date    Chronic pain     Dr Matt Allen in [address] [place] [address] Colon polyp 03/2017    Dr Kacey Zurita    Degenerative arthritis of lumbar spine 03/2016    Dr Warren Wilhelm in [facility]; Dr Madonna Vaughn; Carron Repine 7/16 showed severe multilvevel degen change, large right L4-5 synovial cyst w impingment of R L5 with moderate to severe central stenosis, severe L3-4, mod L2-3 central stenosis and L2-3 left, L3-4bilat, L4-5 left foraminal stenosis; sciatica    FHx: heart disease     Hyperlipidemia     calc risk score 1.9 from (1/12); taking statins due to strong FH    Hypertension 05/2019    IFG (impaired fasting glucose) 5/12    Macular degeneration     Dr. Yadira Erickson Osteopenia     DEXA t score spine 3.5, hip -2.0 (5/12); 0.9 spine, -2.0 hip FRAX 10/1.4 (8/14);  4.5 spine, -2.1 hip (10/16) Dr Overton Later; Fosamax [id number]    Overweight (BMI 25.0-29. 9) 4/5/2018    Skin cancer     SCC and BCC    Varicose vein of leg     Zoster 1980s     Past Surgical History:   Procedure Laterality Date    HX BLADDER SUSPENSION  4/2006    Dr. Jeb Eubanks REMOVAL Bilateral 05/2019    Dr Jamin De Leon 2006 hemorrhoids; 3/17 polyp    HX DILATION AND CURETTAGE      1985, 1988    HX HYSTERECTOMY  2001    later oophorectomy    HX LUMBAR LAMINECTOMY  11/2016    partial RIGHT L4-5 laminectomy for resection of large synovial cyst Dr Galina Ojedaumbjac History    Marital status:      Spouse name: Not on file    Number of children: 1    Years of education: Not on file    Highest education level: Not on file   Occupational History    Occupation:    Tobacco Use    Smoking status: Never Smoker    Smokeless tobacco: Never Used   Substance and Sexual Activity    Alcohol use: Yes     Alcohol/week: 0.0 standard drinks     Comment: social    Drug use: No    Sexual activity: Not Currently   Other Topics Concern    Not on file   Social History Narrative    Not on file     Social Determinants of Health     Financial Resource Strain:     Difficulty of Paying Living Expenses: Not on file   Food Insecurity:     Worried About Running Out of Food in the Last Year: Not on file    Ann of Food in the Last Year: Not on file   Transportation Needs:     Lack of Transportation (Medical): Not on file    Lack of Transportation (Non-Medical):  Not on file   Physical Activity:     Days of Exercise per Week: Not on file    Minutes of Exercise per Session: Not on file   Stress:     Feeling of Stress : Not on file   Social Connections:     Frequency of Communication with Friends and Family: Not on file    Frequency of Social Gatherings with Friends and Family: Not on file    Attends Religion Services: Not on file    Active Member of Clubs or Organizations: Not on file    Attends Club or Organization Meetings: Not on file    Marital Status: Not on file   Intimate Partner Violence:     Fear of Current or Ex-Partner: Not on file    Emotionally Abused: Not on file    Physically Abused: Not on file   Agusto Christina Sexually Abused: Not on file   Housing Stability:     Unable to Pay for Housing in the Last Year: Not on file    Number of Places Lived in the Last Year: Not on file    Unstable Housing in the Last Year: Not on file     Current Outpatient Medications   Medication Sig    atorvastatin (LIPITOR) 10 mg tablet Take 1 Tablet by mouth daily.  amLODIPine (NORVASC) 5 mg tablet Take 1 Tablet by mouth daily.  trimethoprim-sulfamethoxazole (BACTRIM DS, SEPTRA DS) 160-800 mg per tablet Take 1 Tablet by mouth two (2) times a day for 5 days.  OTHER two (2) days a week. Revaee    estradioL 0.025 mg/24 hr ptsw by TransDERmal route two (2) days a week.  ANTIOX #8/OM3/DHA/EPA/LUT/ZEAX (PRESERVISION AREDS 2, OMEGA-3, PO) Take  by mouth.  conjugated estrogens (PREMARIN) 0.625 mg/gram vaginal cream Insert 0.5 g into vagina daily.  clobetasol (TEMOVATE) 0.05 % ointment Apply  to affected area two (2) times a day.  calcium-cholecalciferol, d3, (CALCIUM 600 + D) 600-125 mg-unit Tab Take  by mouth.  MULTIVITAMIN PO Take  by mouth. No current facility-administered medications for this visit. Allergies   Allergen Reactions    Qsymia [Phentermine-Topiramate] Other (comments)     Insomnia       REVIEW OF SYSTEMS:  Dr. Shun Jane 2020, mammo 9/20, DEXA 2018, colo 3/17 Dr Reed Dawkins  Ophtho  no vision change or eye pain  Oral  no mouth pain, tongue or tooth problems  Ears  no hearing loss, ear pain, fullness, no swallowing problems  Cardiac  no CP, PND, orthopnea, edema, palpitations or syncope  Chest  no breast masses  Resp  no wheezing, chronic coughing, dyspnea  GI  no heartburn, nausea, vomiting, change in bowel habits, bleeding, hemorrhoids  Urinary  no dysuria, hematuria, flank pain, urgency, frequency    Visit Vitals  BP (!) 142/82   Pulse 86   Temp 97.7 °F (36.5 °C) (Temporal)   Resp 16   Ht 5' 3\" (1.6 m)   Wt 149 lb (67.6 kg)   SpO2 98%   BMI 26.39 kg/m²   A&O x3  Affect is appropriate.   Mood stable  No apparent distress  Anicteric, no JVD, adenopathy or thyromegaly. No carotid bruits or radiated murmur  Lungs clear to auscultation, no wheezes or rales  Heart showed regular rate and rhythm. No murmur, rubs, gallops  Abdomen soft nontender, no hepatosplenomegaly or masses. Extremities without edema.   Pulses 1-2+ symmetrically    LABS  From 1/12 showed   gluc 94,   cr 0.74, gfr 90,  alt 17,                   ldl-p 2079, chol 246, tg 154, hdl 53, ldl-c 162, wbc 5.0, hb 12.4, plt 275  From 5/12 showed   gluc 106, cr 0.82                         hba1c 5.8, ldl-p 1162, chol 159, tg 150, hdl 49, ldl-c 80,         tsh 1.35  From 11/12 showed gluc 100, cr 0.89                         hba1c 5.8, ldl-p 976,   chol 152, tg 167, hdl 49, ldl-c 70  From 5/13 showed                   hba1c 5.8,                   chol 165, tg 111, hdl 54, ldl-c 89  From 12/13 showed gluc 99,   cr 0.92, gfr 68,  alt 12, hba1c 5.7,               chol 155, tg 115, hdl 51, ldl-c 81  From 6/14 showed   gluc 92,   cr 1.03, gfr 55,  alt 13, hba1c 5.6,     chol 161, tg 98,   hdl 54, ldl-c 87,  wbc 5.6, hb 13.6, plt 257, tsh 1.09  From 12/14 showed        hba1c 5.8,     chol 158, tg 108, hdl 64, ldl-c 72  From 6/15 showed   gluc 98,   cr 0.89, gfr>60, alt 10, hba1c 5.9,     chol 151, tg 81,   hdl 56, ldl-c 79  From 12/15 showed        hba1c 5.6,     chol 161, tg 141, hdl 68, ldl-c 65  From 6/16 showed   gluc 99,   cr 0.88, gfr>60,   hba1c 5.8,               wbc 5.6, hb 12.9, plt 252, hep c neg  From 11/16 showed gluc 93,   cr 0.75, gfr>60,                 wbc 6.1, hb 12.1, plt 247  From 12/16 showed        hba1c 5.0,     chol 181, tg 119, hdl 76, ldl-c 81  From 7/17 showed        hba1c 5.5,     chol 173, tg 203, hdl 60, ldl-c 72  From 3/18 showed   gluc 96,   cr 0.80, gfr>60, alt 23,                wbc 6.4, hb 13.3, plt 251  From 4/19 showed   gluc 87,   cr 0.79, gfr>60, alt 26,      chol 167, tg 148, hdl 67, ldl-c 70,  wbc 5.9, hb 12.9, plt 275, vit d 40.6  From 4/20 showed   gluc 92,   cr 0.86, gfr>60, alt 25,      chol 160, tg 168, hdl 62, ldl-c 64,  wbc 7.4, hb 13.5, plt 295  From 4/21 showed   gluc 95,   cr 0.91, gfr>60, alt 28, hba1c 5.4,     chol 180, tg 217, hdl 54, ldl-c 89,  wbc 7.7, hb 13.7, plt 299    Results for orders placed or performed during the hospital encounter of 04/26/22   CBC W/O DIFF   Result Value Ref Range    WBC 6.6 4.6 - 13.2 K/uL    RBC 4.43 4.20 - 5.30 M/uL    HGB 13.0 12.0 - 16.0 g/dL    HCT 40.8 35.0 - 45.0 %    MCV 92.1 78.0 - 100.0 FL    MCH 29.3 24.0 - 34.0 PG    MCHC 31.9 31.0 - 37.0 g/dL    RDW 12.9 11.6 - 14.5 %    PLATELET 338 238 - 088 K/uL    MPV 10.2 9.2 - 11.8 FL    NRBC 0.0 0  WBC    ABSOLUTE NRBC 0.00 0.00 - 0.69 K/uL   METABOLIC PANEL, COMPREHENSIVE   Result Value Ref Range    Sodium 143 136 - 145 mmol/L    Potassium 3.9 3.5 - 5.5 mmol/L    Chloride 107 100 - 111 mmol/L    CO2 32 21 - 32 mmol/L    Anion gap 4 3.0 - 18 mmol/L    Glucose 93 74 - 99 mg/dL    BUN 19 (H) 7.0 - 18 MG/DL    Creatinine 0.77 0.6 - 1.3 MG/DL    BUN/Creatinine ratio 25 (H) 12 - 20      GFR est AA >60 >60 ml/min/1.73m2    GFR est non-AA >60 >60 ml/min/1.73m2    Calcium 9.3 8.5 - 10.1 MG/DL    Bilirubin, total 1.0 0.2 - 1.0 MG/DL    ALT (SGPT) 29 13 - 56 U/L    AST (SGOT) 22 10 - 38 U/L    Alk.  phosphatase 72 45 - 117 U/L    Protein, total 7.0 6.4 - 8.2 g/dL    Albumin 3.7 3.4 - 5.0 g/dL    Globulin 3.3 2.0 - 4.0 g/dL    A-G Ratio 1.1 0.8 - 1.7     LIPID PANEL   Result Value Ref Range    LIPID PROFILE          Cholesterol, total 156 <200 MG/DL    Triglyceride 115 <150 MG/DL    HDL Cholesterol 60 40 - 60 MG/DL    LDL, calculated 73 0 - 100 MG/DL    VLDL, calculated 23 MG/DL    CHOL/HDL Ratio 2.6 0 - 5.0     HEMOGLOBIN A1C WITH EAG   Result Value Ref Range    Hemoglobin A1c 5.3 4.2 - 5.6 %    Est. average glucose 105 mg/dL     We reviewed the patient's labs from the last several visits to point out trends in the numbers          Patient Active Problem List   Diagnosis Code    Osteopenia FRAX 8/14 10 and 1.4 M85.80    Hyperlipidemia E78.5    Macular degeneration Dr. Maxime Olivia H35.30    IFG (impaired fasting glucose) R73.01    Arthritis, degenerative M19.90    GERD without esophagitis K21.9    Advance directive in chart Z78.9    Overweight (BMI 25.0-29. 9) E66.3    Primary hypertension I10     ASSESSMENT AND PLAN  1.  DJD. Prn nsaids  2. AMD.  F/U Dr. Maxime Olivia  3. GERD. Prn PPI and avoidance measures  4. Osteopenia. Ca/D/wt bearing exercises. Per Dr Natacha Curiel  5. Hyperlipidemia and strong FH chd.  Continue lipitor   6. IFG. Weight loss, continue dietary and lifestyle measures  7. Colon polyp. Fiber, colo 2027 Dr Dayanna Balbuena  8. Overweight. Lifestyle and dietary measures. Portion control reiterated. 9.  HTN. Continue current regimen. 10.  Spine/chronic pain. For [facility] stimulator placement tomorrow by Dr Mouna Hays. We discussed rose type meds at length and she will consider them if the procedure does not help. Quite concerned about the sfx of meds  11. She requested script for uti in case she has one in future      RTC 5/23    Above conditions discussed at length and patient vocalized understanding. All questions answered to patient satisfaction            ICD-10-CM ICD-9-CM    1. GERD without esophagitis  K21.9 530.81    2. Hyperlipidemia, unspecified hyperlipidemia type  E78.5 272.4 atorvastatin (LIPITOR) 10 mg tablet      LIPID PANEL   3. Primary hypertension  I10 401.9 amLODIPine (NORVASC) 5 mg tablet      CBC W/O DIFF   4. Acute cystitis without hematuria  N30.00 595.0 trimethoprim-sulfamethoxazole (BACTRIM DS, SEPTRA DS) 160-800 mg per tablet   5. IFG (impaired fasting glucose)  T33.33 924.53 METABOLIC PANEL, COMPREHENSIVE      HEMOGLOBIN A1C WITH EAG   6. Macular degeneration, unspecified laterality, unspecified type  H35.30 362.50    7.  Lumbar radiculopathy  M54.16 724.4

## 2022-05-10 ENCOUNTER — OFFICE VISIT (OUTPATIENT)
Dept: INTERNAL MEDICINE CLINIC | Age: 67
End: 2022-05-10
Payer: MEDICARE

## 2022-05-10 VITALS
RESPIRATION RATE: 16 BRPM | DIASTOLIC BLOOD PRESSURE: 82 MMHG | OXYGEN SATURATION: 98 % | HEART RATE: 86 BPM | TEMPERATURE: 97.7 F | HEIGHT: 63 IN | WEIGHT: 149 LBS | BODY MASS INDEX: 26.4 KG/M2 | SYSTOLIC BLOOD PRESSURE: 142 MMHG

## 2022-05-10 DIAGNOSIS — Z00.00 MEDICARE ANNUAL WELLNESS VISIT, SUBSEQUENT: Primary | ICD-10-CM

## 2022-05-10 DIAGNOSIS — K21.9 GERD WITHOUT ESOPHAGITIS: ICD-10-CM

## 2022-05-10 DIAGNOSIS — E78.5 HYPERLIPIDEMIA, UNSPECIFIED HYPERLIPIDEMIA TYPE: ICD-10-CM

## 2022-05-10 DIAGNOSIS — M54.16 LUMBAR RADICULOPATHY: ICD-10-CM

## 2022-05-10 DIAGNOSIS — N30.00 ACUTE CYSTITIS WITHOUT HEMATURIA: ICD-10-CM

## 2022-05-10 DIAGNOSIS — H35.30 MACULAR DEGENERATION, UNSPECIFIED LATERALITY, UNSPECIFIED TYPE: ICD-10-CM

## 2022-05-10 DIAGNOSIS — I10 PRIMARY HYPERTENSION: ICD-10-CM

## 2022-05-10 DIAGNOSIS — R73.01 IFG (IMPAIRED FASTING GLUCOSE): ICD-10-CM

## 2022-05-10 PROCEDURE — G8754 DIAS BP LESS 90: HCPCS | Performed by: INTERNAL MEDICINE

## 2022-05-10 PROCEDURE — G8536 NO DOC ELDER MAL SCRN: HCPCS | Performed by: INTERNAL MEDICINE

## 2022-05-10 PROCEDURE — G0439 PPPS, SUBSEQ VISIT: HCPCS | Performed by: INTERNAL MEDICINE

## 2022-05-10 PROCEDURE — G8427 DOCREV CUR MEDS BY ELIG CLIN: HCPCS | Performed by: INTERNAL MEDICINE

## 2022-05-10 PROCEDURE — G8399 PT W/DXA RESULTS DOCUMENT: HCPCS | Performed by: INTERNAL MEDICINE

## 2022-05-10 PROCEDURE — G8419 CALC BMI OUT NRM PARAM NOF/U: HCPCS | Performed by: INTERNAL MEDICINE

## 2022-05-10 PROCEDURE — G8510 SCR DEP NEG, NO PLAN REQD: HCPCS | Performed by: INTERNAL MEDICINE

## 2022-05-10 PROCEDURE — 3017F COLORECTAL CA SCREEN DOC REV: CPT | Performed by: INTERNAL MEDICINE

## 2022-05-10 PROCEDURE — 1101F PT FALLS ASSESS-DOCD LE1/YR: CPT | Performed by: INTERNAL MEDICINE

## 2022-05-10 PROCEDURE — 1090F PRES/ABSN URINE INCON ASSESS: CPT | Performed by: INTERNAL MEDICINE

## 2022-05-10 PROCEDURE — G0463 HOSPITAL OUTPT CLINIC VISIT: HCPCS | Performed by: INTERNAL MEDICINE

## 2022-05-10 PROCEDURE — G9899 SCRN MAM PERF RSLTS DOC: HCPCS | Performed by: INTERNAL MEDICINE

## 2022-05-10 PROCEDURE — G8753 SYS BP > OR = 140: HCPCS | Performed by: INTERNAL MEDICINE

## 2022-05-10 PROCEDURE — 99214 OFFICE O/P EST MOD 30 MIN: CPT | Performed by: INTERNAL MEDICINE

## 2022-05-10 RX ORDER — AMLODIPINE BESYLATE 5 MG/1
5 TABLET ORAL DAILY
Qty: 90 TABLET | Refills: 3 | Status: SHIPPED | OUTPATIENT
Start: 2022-05-10

## 2022-05-10 RX ORDER — SULFAMETHOXAZOLE AND TRIMETHOPRIM 800; 160 MG/1; MG/1
1 TABLET ORAL 2 TIMES DAILY
Qty: 10 TABLET | Refills: 0 | Status: SHIPPED | OUTPATIENT
Start: 2022-05-10 | End: 2022-05-15

## 2022-05-10 RX ORDER — ATORVASTATIN CALCIUM 10 MG/1
10 TABLET, FILM COATED ORAL DAILY
Qty: 90 TABLET | Refills: 3 | Status: SHIPPED | OUTPATIENT
Start: 2022-05-10

## 2022-05-10 NOTE — PATIENT INSTRUCTIONS
Medicare Wellness Visit, Female     The best way to live healthy is to have a lifestyle where you eat a well-balanced diet, exercise regularly, limit alcohol use, and quit all forms of tobacco/nicotine, if applicable. Regular preventive services are another way to keep healthy. Preventive services (vaccines, screening tests, monitoring & exams) can help personalize your care plan, which helps you manage your own care. Screening tests can find health problems at the earliest stages, when they are easiest to treat. [facility] follows the current, evidence-based guidelines published by the [facility] ([facility]) when recommending preventive services for our patients. Because we follow these guidelines, sometimes recommendations change over time as research supports it. (For example, mammograms used to be recommended annually. Even though Medicare will still pay for an annual mammogram, the newer guidelines recommend a mammogram every two years for women of average risk). Of course, you and your doctor may decide to screen more often for some diseases, based on your risk and your co-morbidities (chronic disease you are already diagnosed with). Preventive services for you include:  - Medicare offers their members a free annual wellness visit, which is time for you and your primary care provider to discuss and plan for your preventive service needs. Take advantage of this benefit every year!  -All adults over the age of 72 should receive the recommended pneumonia vaccines. Current USPSTF guidelines recommend a series of two vaccines for the best pneumonia protection.   -All adults should have a flu vaccine yearly and a tetanus vaccine every 10 years.   -All adults age 48 and older should receive the shingles vaccines (series of two vaccines).       -All adults age 38-68 who are overweight should have a diabetes screening test once every three years.   -All adults born between [birth year] and 1965 should be screened once for Hepatitis C.  -Other screening tests and preventive services for persons with diabetes include: an eye exam to screen for diabetic retinopathy, a kidney function test, a foot exam, and stricter control over your cholesterol.   -Cardiovascular screening for adults with routine risk involves an electrocardiogram (ECG) at intervals determined by your doctor.   -Colorectal cancer screenings should be done for adults age 54-65 with no increased risk factors for colorectal cancer. There are a number of acceptable methods of screening for this type of cancer. Each test has its own benefits and drawbacks. Discuss with your doctor what is most appropriate for you during your annual wellness visit. The different tests include: colonoscopy (considered the best screening method), a fecal occult blood test, a fecal DNA test, and sigmoidoscopy.    -A bone mass density test is recommended when a woman turns 65 to screen for osteoporosis. This test is only recommended one time, as a screening. Some providers will use this same test as a disease monitoring tool if you already have osteoporosis. -Breast cancer screenings are recommended every other year for women of normal risk, age 54-69.  -Cervical cancer screenings for women over age 72 are only recommended with certain risk factors. Here is a list of your current Health Maintenance items (your personalized list of preventive services) with a due date: There are no preventive care reminders to display for this patient.

## 2022-05-10 NOTE — PROGRESS NOTES
Renea Kaur presents with   Chief Complaint   Patient presents with   [facility] Annual Wellness Visit    Cholesterol Problem    Hypertension    Labs     4-26-22    Medication Refill     patient is requesting RX in the event she gets a UTI                 1. \"Have you been to the ER, urgent care clinic since your last visit? Hospitalized since your last visit? \" Cresenciano Embs for vertiflex on 9-1-22    2. \"Have you seen or consulted any other health care providers outside of the [address] [place] [address] since your last visit? \" YES, Elías     3. For patients aged 39-70: Has the patient had a colonoscopy / FIT/ Cologuard? Yes - no Care Gap present      If the patient is female:    4. For patients aged 41-77: Has the patient had a mammogram within the past 2 years? Yes - no Care Gap present      5. For patients aged 21-65: Has the patient had a pap smear?  NA - based on age or sex

## 2022-07-22 ENCOUNTER — TELEPHONE (OUTPATIENT)
Dept: INTERNAL MEDICINE CLINIC | Age: 67
End: 2022-07-22

## 2022-07-22 NOTE — TELEPHONE ENCOUNTER
Patient called, states she had an OV with Dr. Dali Powers on 5/10/22, and had surgery to implant a spinal cord stimulator the next day on 5/11/22. She states that Dr. Dali Powers had asked at the appt if she had ever taken gabapentin, she said no, and he said that he would suggest that she take it. She asked the surgeon about gabapentin and the surgeon told her that his goal was to keep people off of medications, but he would prescribe a low dose of it. She states that she is currently taking 100mg 3 times a day, and it helps when she is around the house, but once she is out walking around at like the grocery store, it doesn't help enough. She is hesitant to go back to the surgeon to ask for a higher dosage because of what he said about trying to keep people off of medications. Patient is asking if Dr. Dali Powers would be willing to prescribe her a higher dosage of the gabapentin to possibly give her more relief? Pharmacy: [facility] on [address] [facility]  Patient can be reached at [phone].   Please advise, thank you

## 2022-07-25 NOTE — TELEPHONE ENCOUNTER
Inc to 200 tid and call w update in 1 week  We can inc to 300 tid (300mg tab) thereafter and titrate up as needed or until she has sfx

## 2022-07-25 NOTE — TELEPHONE ENCOUNTER
Patient called, states she had an OV with Dr. Brayan Mcgill on 5/10/22, and had surgery to implant a spinal cord stimulator the next day on 5/11/22. She states that Dr. Brayan Mcgill had asked at the appt if she had ever taken gabapentin, she said no, and he said that he would suggest that she take it. She asked the surgeon about gabapentin and the surgeon told her that his goal was to keep people off of medications, but he would prescribe a low dose of it. She states that she is currently taking 100mg 3 times a day, and it helps when she is around the house, but once she is out walking around at like the grocery store, it doesn't help enough. She is hesitant to go back to the surgeon to ask for a higher dosage because of what he said about trying to keep people off of medications. Patient is asking if Dr. Brayan Mcgill would be willing to prescribe her a higher dosage of the gabapentin to possibly give her more relief? Pharmacy: [facility] on [address]  Patient can be reached at [phone].   Please advise, thank you

## 2022-08-15 ENCOUNTER — PATIENT MESSAGE (OUTPATIENT)
Dept: INTERNAL MEDICINE CLINIC | Age: 67
End: 2022-08-15

## 2022-08-15 NOTE — TELEPHONE ENCOUNTER
Pls call    Inc gabapentin to 200 (take 2 of 100s 3x/d)    If sx controlled and no sfx, we can call in higher dosing    If not, inc to 300 tid (there's 300mg tab avail) and will call that in

## 2022-08-15 NOTE — TELEPHONE ENCOUNTER
From: Jennifer Pina  To: Omer Ayon MD  Sent: 8/15/2022 3:09 PM EDT  Subject: Gabapentin    A week or two ago I called the office and inquired if I could get a prescription for Gabapentin. Dr. Mouna Andrew and I discussed this during my office visit in May prior to my spinal cord stimulator implantation surgery. At my post-op appointment at the [facility], I asked Dr. Jazmyne Newman about it and he replied it was his goal to keep his patients off medications but that he was willing to prescribe a low dose if I felt strongly about it. We discussed side effects, etc. and I asked for an RX. I am currently taking 100MG 3/x a day. I feel like it has helped me to the point that some days I don't need to turn the stimulator on if I am staying home all day, but it does not help enough when I am standing for any period of time or walking any further than a trip through a grocery store. I would prefer Dr. Mouna Andrew prescribe it as he was more supportive of me taking the medication. I have one remaining refill on the current prescription and have had no noticeable side effects, but feel   a higher dosage would probably be more of a benefit than remaining on the current dosage. I am currently getting my prescriptions from the [facility] in [facility]. Please let me know if you have any questions. Thanks!

## 2022-08-16 ENCOUNTER — PATIENT MESSAGE (OUTPATIENT)
Dept: INTERNAL MEDICINE CLINIC | Age: 67
End: 2022-08-16

## 2022-08-16 DIAGNOSIS — M54.10 RADICULITIS: Primary | ICD-10-CM

## 2022-08-16 RX ORDER — GABAPENTIN 100 MG/1
200 CAPSULE ORAL 3 TIMES DAILY
Qty: 180 CAPSULE | Refills: 3 | Status: SHIPPED | OUTPATIENT
Start: 2022-08-16 | End: 2022-09-14 | Stop reason: DRUGHIGH

## 2022-08-16 NOTE — TELEPHONE ENCOUNTER
----- Message from Frank Cox. Kae Morejon sent at 8/16/2022 11:56 AM EDT -----  Regarding: Gabapentin  After my phone conversation with Yisel Rodriguez this morning I counted my remaining gabapentin capsules. If I increase the dosage to 2 100mg 3x/day, I will run out Saturday evening. My pharmacy asks that I request the refill as soon as possible in order to insure they have enough on hand, tho they will not allow me to pick it up until I'm very near to running out. Should I request the refill now for  in about 10-11 days and increase the dosage then? Or  can Dr. Sidney Sunshine call in a new RX now for a 30 day supply of 180 100mg capsules? If I have side effects at 600mg total/day, I can back down to the current dosage. Thanks again for all your help!

## 2022-08-16 NOTE — TELEPHONE ENCOUNTER
Regarding: Gabapentin  ----- Message from Kaelyn Navarrete MD sent at 8/15/2022  4:59 PM EDT -----       ----- Message from Xena Morris to Valentin Deluna MD sent at 8/15/2022  3:09 PM -----   A week or two ago I called the office and inquired if I could get a prescription for Gabapentin. Dr. Rudy Maria and I discussed this during my office visit in May prior to my spinal cord stimulator implantation surgery. At my post-op appointment at the [facility], I asked Dr. Pola Rubi about it and he replied it was his goal to keep his patients off medications but that he was willing to prescribe a low dose if I felt strongly about it. We discussed side effects, etc. and I asked for an RX. I am currently taking 100MG 3/x  a day. I feel like it has helped me to the point that some days I don't need to turn the stimulator on if I am staying home all day, but it does not help enough when I am standing for any period of time or walking any further than a trip through a grocery store. I would prefer Dr. Rudy Maria prescribe it as he was more supportive of me taking the medication. I have one remaining refill on the current prescription and have had no noticeable side effects, but feel   a higher dosage would probably be more of a benefit than remaining on the current dosage. I am currently getting my prescriptions from the [facility] in [facility] on [facility]. Please let me know if you have any questions. Thanks!

## 2022-09-14 RX ORDER — GABAPENTIN 300 MG/1
300 CAPSULE ORAL 3 TIMES DAILY
Qty: 90 CAPSULE | Refills: 5 | Status: SHIPPED | OUTPATIENT
Start: 2022-09-14

## 2022-10-03 ENCOUNTER — CONSULTATION/EVALUATION (OUTPATIENT)
Dept: RURAL CLINIC 1 | Facility: CLINIC | Age: 67
End: 2022-10-03

## 2022-10-03 DIAGNOSIS — H25.12: ICD-10-CM

## 2022-10-03 PROCEDURE — 92014 COMPRE OPH EXAM EST PT 1/>: CPT

## 2022-10-03 ASSESSMENT — VISUAL ACUITY
OS_AM: 20/40
OS_PH: 20/40
OS_BAT: CF 1FT
OS_CC: 20/80

## 2022-10-03 ASSESSMENT — TONOMETRY
OS_IOP_MMHG: 14
OD_IOP_MMHG: 14

## 2022-10-13 ENCOUNTER — PRE-OP/H&P (OUTPATIENT)
Dept: RURAL CLINIC 1 | Facility: CLINIC | Age: 67
End: 2022-10-13

## 2022-10-13 VITALS
BODY MASS INDEX: 26.58 KG/M2 | SYSTOLIC BLOOD PRESSURE: 162 MMHG | WEIGHT: 150 LBS | DIASTOLIC BLOOD PRESSURE: 85 MMHG | HEIGHT: 63 IN | HEART RATE: 92 BPM

## 2022-10-13 DIAGNOSIS — H25.12: ICD-10-CM

## 2022-10-13 PROCEDURE — 99499 UNLISTED E&M SERVICE: CPT

## 2022-10-25 ENCOUNTER — SURGERY/PROCEDURE (OUTPATIENT)
Dept: URBAN - METROPOLITAN AREA SURGERY 3 | Facility: SURGERY | Age: 67
End: 2022-10-25

## 2022-10-25 DIAGNOSIS — H25.12: ICD-10-CM

## 2022-10-25 PROCEDURE — 68841 INSJ RX ELUT IMPLT LAC CANAL: CPT

## 2022-10-25 PROCEDURE — 66984 XCAPSL CTRC RMVL W/O ECP: CPT

## 2022-10-26 ENCOUNTER — POST-OP (OUTPATIENT)
Dept: RURAL CLINIC 2 | Facility: CLINIC | Age: 67
End: 2022-10-26

## 2022-10-26 DIAGNOSIS — Z96.1: ICD-10-CM

## 2022-10-26 PROCEDURE — 99024 POSTOP FOLLOW-UP VISIT: CPT

## 2022-10-26 ASSESSMENT — VISUAL ACUITY
OS_PH: 20/25-2
OS_SC: 20/50-1

## 2022-10-26 ASSESSMENT — TONOMETRY
OD_IOP_MMHG: 14
OS_IOP_MMHG: 14

## 2022-11-01 ENCOUNTER — POST-OP (OUTPATIENT)
Dept: RURAL CLINIC 2 | Facility: CLINIC | Age: 67
End: 2022-11-01

## 2022-11-01 DIAGNOSIS — Z96.1: ICD-10-CM

## 2022-11-01 PROCEDURE — 99024 POSTOP FOLLOW-UP VISIT: CPT

## 2022-11-01 ASSESSMENT — TONOMETRY
OD_IOP_MMHG: 22
OS_IOP_MMHG: 20

## 2022-11-01 ASSESSMENT — VISUAL ACUITY
OS_SC: 20/40
OD_SC: CF 1FT
OS_PH: 20/30

## 2022-11-15 ENCOUNTER — POST-OP (OUTPATIENT)
Dept: RURAL CLINIC 2 | Facility: CLINIC | Age: 67
End: 2022-11-15

## 2022-11-15 DIAGNOSIS — Z96.1: ICD-10-CM

## 2022-11-15 PROCEDURE — 99024 POSTOP FOLLOW-UP VISIT: CPT

## 2022-11-15 ASSESSMENT — TONOMETRY
OD_IOP_MMHG: 20
OS_IOP_MMHG: 20

## 2022-11-15 ASSESSMENT — VISUAL ACUITY
OD_SC: CF 4FT
OS_SC: 20/30

## 2022-11-22 ENCOUNTER — TRANSCRIBE ORDER (OUTPATIENT)
Dept: SCHEDULING | Age: 67
End: 2022-11-22

## 2022-11-22 DIAGNOSIS — Z12.31 VISIT FOR SCREENING MAMMOGRAM: Primary | ICD-10-CM

## 2022-12-05 DIAGNOSIS — M54.10 RADICULITIS: ICD-10-CM

## 2022-12-05 NOTE — TELEPHONE ENCOUNTER
Patient called and needs a medication refill. Also states can it be increased to 600 mg? Please advise. Requested Prescriptions     Pending Prescriptions Disp Refills    gabapentin (NEURONTIN) 300 mg capsule 90 Capsule 5     Sig: Take 1 Capsule by mouth three (3) times daily. Max Daily Amount: 900 mg.

## 2022-12-07 RX ORDER — GABAPENTIN 300 MG/1
300 CAPSULE ORAL 3 TIMES DAILY
Qty: 90 CAPSULE | Refills: 5 | Status: SHIPPED | OUTPATIENT
Start: 2022-12-07 | End: 2022-12-08 | Stop reason: DRUGHIGH

## 2022-12-08 ENCOUNTER — PATIENT MESSAGE (OUTPATIENT)
Dept: INTERNAL MEDICINE CLINIC | Age: 67
End: 2022-12-08

## 2022-12-08 DIAGNOSIS — M19.90 OSTEOARTHRITIS, UNSPECIFIED OSTEOARTHRITIS TYPE, UNSPECIFIED SITE: Primary | ICD-10-CM

## 2022-12-08 DIAGNOSIS — M47.816 OSTEOARTHRITIS OF LUMBAR SPINE, UNSPECIFIED SPINAL OSTEOARTHRITIS COMPLICATION STATUS: ICD-10-CM

## 2022-12-08 RX ORDER — GABAPENTIN 600 MG/1
600 TABLET ORAL 3 TIMES DAILY
Qty: 270 TABLET | Refills: 1 | Status: SHIPPED | OUTPATIENT
Start: 2022-12-08

## 2022-12-08 NOTE — TELEPHONE ENCOUNTER
----- Message from Frank Espino sent at 12/8/2022 12:41 PM EST -----  Regarding: Gabapentin Dosage  Please increase to 600 3x daily as noted in previous message. (Donovan Marcos is not allowing me to send this request in original message thread.) Thank you!

## 2023-01-31 DIAGNOSIS — Z12.31 VISIT FOR SCREENING MAMMOGRAM: Primary | ICD-10-CM

## 2023-02-01 DIAGNOSIS — Z12.31 VISIT FOR SCREENING MAMMOGRAM: Primary | ICD-10-CM

## 2023-02-04 DIAGNOSIS — Z12.31 VISIT FOR SCREENING MAMMOGRAM: Primary | ICD-10-CM

## 2023-03-09 ENCOUNTER — HOSPITAL ENCOUNTER (OUTPATIENT)
Facility: HOSPITAL | Age: 68
Discharge: HOME | End: 2023-03-09
Payer: MEDICARE

## 2023-03-09 DIAGNOSIS — Z12.31 VISIT FOR SCREENING MAMMOGRAM: ICD-10-CM

## 2023-03-09 PROCEDURE — 77067 SCR MAMMO BI INCL CAD: CPT

## 2023-05-08 DIAGNOSIS — E78.5 HYPERLIPIDEMIA, UNSPECIFIED HYPERLIPIDEMIA TYPE: Primary | ICD-10-CM

## 2023-05-08 DIAGNOSIS — R73.01 IFG (IMPAIRED FASTING GLUCOSE): ICD-10-CM

## 2023-05-10 ENCOUNTER — HOSPITAL ENCOUNTER (OUTPATIENT)
Facility: HOSPITAL | Age: 68
Setting detail: SPECIMEN
Discharge: HOME | End: 2023-05-13
Payer: MEDICARE

## 2023-05-10 DIAGNOSIS — R73.01 IFG (IMPAIRED FASTING GLUCOSE): ICD-10-CM

## 2023-05-10 DIAGNOSIS — E78.5 HYPERLIPIDEMIA, UNSPECIFIED HYPERLIPIDEMIA TYPE: ICD-10-CM

## 2023-05-10 LAB
ALBUMIN SERPL-MCNC: 3.8 G/DL (ref 3.4–5)
ALBUMIN/GLOB SERPL: 1.2 (ref 0.8–1.7)
ALP SERPL-CCNC: 76 U/L (ref 45–117)
ALT SERPL-CCNC: 28 U/L (ref 13–56)
ANION GAP SERPL CALC-SCNC: 4 MMOL/L (ref 3–18)
AST SERPL-CCNC: 23 U/L (ref 10–38)
BILIRUB SERPL-MCNC: 0.9 MG/DL (ref 0.2–1)
BUN SERPL-MCNC: 23 MG/DL (ref 7–18)
BUN/CREAT SERPL: 28 (ref 12–20)
CALCIUM SERPL-MCNC: 9.3 MG/DL (ref 8.5–10.1)
CHLORIDE SERPL-SCNC: 107 MMOL/L (ref 100–111)
CHOLEST SERPL-MCNC: 147 MG/DL
CO2 SERPL-SCNC: 30 MMOL/L (ref 21–32)
CREAT SERPL-MCNC: 0.83 MG/DL (ref 0.6–1.3)
ERYTHROCYTE [DISTWIDTH] IN BLOOD BY AUTOMATED COUNT: 12.9 % (ref 11.6–14.5)
EST. AVERAGE GLUCOSE BLD GHB EST-MCNC: 114 MG/DL
GLOBULIN SER CALC-MCNC: 3.3 G/DL (ref 2–4)
GLUCOSE SERPL-MCNC: 97 MG/DL (ref 74–99)
HBA1C MFR BLD: 5.6 % (ref 4.2–5.6)
HCT VFR BLD AUTO: 42 % (ref 35–45)
HDLC SERPL-MCNC: 62 MG/DL (ref 40–60)
HDLC SERPL: 2.4 (ref 0–5)
HGB BLD-MCNC: 13.6 G/DL (ref 12–16)
LDLC SERPL CALC-MCNC: 51.8 MG/DL (ref 0–100)
LIPID PANEL: ABNORMAL
MCH RBC QN AUTO: 29.8 PG (ref 24–34)
MCHC RBC AUTO-ENTMCNC: 32.4 G/DL (ref 31–37)
MCV RBC AUTO: 92.1 FL (ref 78–100)
NRBC # BLD: 0 K/UL (ref 0–0.01)
NRBC BLD-RTO: 0 PER 100 WBC
PLATELET # BLD AUTO: 280 K/UL (ref 135–420)
PMV BLD AUTO: 10.6 FL (ref 9.2–11.8)
POTASSIUM SERPL-SCNC: 4.2 MMOL/L (ref 3.5–5.5)
PROT SERPL-MCNC: 7.1 G/DL (ref 6.4–8.2)
RBC # BLD AUTO: 4.56 M/UL (ref 4.2–5.3)
SODIUM SERPL-SCNC: 141 MMOL/L (ref 136–145)
TRIGL SERPL-MCNC: 166 MG/DL
VLDLC SERPL CALC-MCNC: 33.2 MG/DL
WBC # BLD AUTO: 6.4 K/UL (ref 4.6–13.2)

## 2023-05-10 PROCEDURE — 80061 LIPID PANEL: CPT

## 2023-05-10 PROCEDURE — 85027 COMPLETE CBC AUTOMATED: CPT

## 2023-05-10 PROCEDURE — 80053 COMPREHEN METABOLIC PANEL: CPT

## 2023-05-10 PROCEDURE — 83036 HEMOGLOBIN GLYCOSYLATED A1C: CPT

## 2023-05-10 PROCEDURE — 36415 COLL VENOUS BLD VENIPUNCTURE: CPT

## 2023-05-13 SDOH — ECONOMIC STABILITY: HOUSING INSECURITY
IN THE LAST 12 MONTHS, WAS THERE A TIME WHEN YOU DID NOT HAVE A STEADY PLACE TO SLEEP OR SLEPT IN A SHELTER (INCLUDING NOW)?: NO

## 2023-05-13 SDOH — ECONOMIC STABILITY: FOOD INSECURITY: WITHIN THE PAST 12 MONTHS, YOU WORRIED THAT YOUR FOOD WOULD RUN OUT BEFORE YOU GOT MONEY TO BUY MORE.: NEVER TRUE

## 2023-05-13 SDOH — ECONOMIC STABILITY: INCOME INSECURITY: HOW HARD IS IT FOR YOU TO PAY FOR THE VERY BASICS LIKE FOOD, HOUSING, MEDICAL CARE, AND HEATING?: NOT HARD AT ALL

## 2023-05-13 SDOH — ECONOMIC STABILITY: TRANSPORTATION INSECURITY
IN THE PAST 12 MONTHS, HAS LACK OF TRANSPORTATION KEPT YOU FROM MEETINGS, WORK, OR FROM GETTING THINGS NEEDED FOR DAILY LIVING?: NO

## 2023-05-13 SDOH — ECONOMIC STABILITY: FOOD INSECURITY: WITHIN THE PAST 12 MONTHS, THE FOOD YOU BOUGHT JUST DIDN'T LAST AND YOU DIDN'T HAVE MONEY TO GET MORE.: NEVER TRUE

## 2023-05-13 ASSESSMENT — PATIENT HEALTH QUESTIONNAIRE - PHQ9
SUM OF ALL RESPONSES TO PHQ QUESTIONS 1-9: 0
SUM OF ALL RESPONSES TO PHQ QUESTIONS 1-9: 0
SUM OF ALL RESPONSES TO PHQ9 QUESTIONS 1 & 2: 0
SUM OF ALL RESPONSES TO PHQ QUESTIONS 1-9: 0
2. FEELING DOWN, DEPRESSED OR HOPELESS: 0
SUM OF ALL RESPONSES TO PHQ QUESTIONS 1-9: 0
1. LITTLE INTEREST OR PLEASURE IN DOING THINGS: 0

## 2023-05-13 ASSESSMENT — LIFESTYLE VARIABLES
HOW OFTEN DO YOU HAVE SIX OR MORE DRINKS ON ONE OCCASION: 1
HOW OFTEN DO YOU HAVE A DRINK CONTAINING ALCOHOL: MONTHLY OR LESS
HOW MANY STANDARD DRINKS CONTAINING ALCOHOL DO YOU HAVE ON A TYPICAL DAY: 1 OR 2
HOW OFTEN DO YOU HAVE A DRINK CONTAINING ALCOHOL: 2
HOW MANY STANDARD DRINKS CONTAINING ALCOHOL DO YOU HAVE ON A TYPICAL DAY: 1

## 2023-05-15 ASSESSMENT — LIFESTYLE VARIABLES
HOW MANY STANDARD DRINKS CONTAINING ALCOHOL DO YOU HAVE ON A TYPICAL DAY: 1
HOW OFTEN DO YOU HAVE A DRINK CONTAINING ALCOHOL: 2
HOW OFTEN DO YOU HAVE A DRINK CONTAINING ALCOHOL: MONTHLY OR LESS
HOW OFTEN DO YOU HAVE SIX OR MORE DRINKS ON ONE OCCASION: 1
HOW MANY STANDARD DRINKS CONTAINING ALCOHOL DO YOU HAVE ON A TYPICAL DAY: 1 OR 2

## 2023-05-15 NOTE — PROGRESS NOTES
Jordyn Lopez presents today for   Chief Complaint   Patient presents with    Medicare AWV     5-10-23    Blood Sugar Problem    Hyperlipidemia    Gastroesophageal Reflux     1. \"Have you been to the ER, urgent care clinic since your last visit? Hospitalized since your last visit? \" no    2. \"Have you seen or consulted any other health care providers outside of the [address] [place] [address] since your last visit? \" no     3. For patients aged 39-70: Has the patient had a colonoscopy / FIT/ Cologuard? Yes - no Care Gap present      If the patient is female:    4. For patients aged 41-77: Has the patient had a mammogram within the past 2 years? Yes - no Care Gap present      5. For patients aged 21-65: Has the patient had a pap smear?  NA - based on age or sex

## 2023-05-16 ENCOUNTER — OFFICE VISIT (OUTPATIENT)
Age: 68
End: 2023-05-16
Payer: MEDICARE

## 2023-05-16 VITALS
DIASTOLIC BLOOD PRESSURE: 82 MMHG | SYSTOLIC BLOOD PRESSURE: 136 MMHG | WEIGHT: 152 LBS | TEMPERATURE: 97.9 F | OXYGEN SATURATION: 98 % | BODY MASS INDEX: 26.93 KG/M2 | HEIGHT: 63 IN | HEART RATE: 91 BPM | RESPIRATION RATE: 19 BRPM

## 2023-05-16 DIAGNOSIS — E66.3 OVERWEIGHT (BMI 25.0-29.9): ICD-10-CM

## 2023-05-16 DIAGNOSIS — R73.01 IFG (IMPAIRED FASTING GLUCOSE): ICD-10-CM

## 2023-05-16 DIAGNOSIS — K21.9 GERD WITHOUT ESOPHAGITIS: ICD-10-CM

## 2023-05-16 DIAGNOSIS — Z00.00 MEDICARE ANNUAL WELLNESS VISIT, SUBSEQUENT: Primary | ICD-10-CM

## 2023-05-16 DIAGNOSIS — I10 PRIMARY HYPERTENSION: ICD-10-CM

## 2023-05-16 DIAGNOSIS — N30.00 ACUTE CYSTITIS WITHOUT HEMATURIA: ICD-10-CM

## 2023-05-16 DIAGNOSIS — Z71.89 ADVANCED CARE PLANNING/COUNSELING DISCUSSION: ICD-10-CM

## 2023-05-16 DIAGNOSIS — E78.5 HYPERLIPIDEMIA, UNSPECIFIED HYPERLIPIDEMIA TYPE: ICD-10-CM

## 2023-05-16 PROCEDURE — G8419 CALC BMI OUT NRM PARAM NOF/U: HCPCS | Performed by: INTERNAL MEDICINE

## 2023-05-16 PROCEDURE — 3017F COLORECTAL CA SCREEN DOC REV: CPT | Performed by: INTERNAL MEDICINE

## 2023-05-16 PROCEDURE — 3075F SYST BP GE 130 - 139MM HG: CPT | Performed by: INTERNAL MEDICINE

## 2023-05-16 PROCEDURE — 99211 OFF/OP EST MAY X REQ PHY/QHP: CPT

## 2023-05-16 PROCEDURE — 1036F TOBACCO NON-USER: CPT | Performed by: INTERNAL MEDICINE

## 2023-05-16 PROCEDURE — 99214 OFFICE O/P EST MOD 30 MIN: CPT | Performed by: INTERNAL MEDICINE

## 2023-05-16 PROCEDURE — G0439 PPPS, SUBSEQ VISIT: HCPCS | Performed by: INTERNAL MEDICINE

## 2023-05-16 PROCEDURE — G8399 PT W/DXA RESULTS DOCUMENT: HCPCS | Performed by: INTERNAL MEDICINE

## 2023-05-16 PROCEDURE — 1123F ACP DISCUSS/DSCN MKR DOCD: CPT | Performed by: INTERNAL MEDICINE

## 2023-05-16 PROCEDURE — 1090F PRES/ABSN URINE INCON ASSESS: CPT | Performed by: INTERNAL MEDICINE

## 2023-05-16 PROCEDURE — G8427 DOCREV CUR MEDS BY ELIG CLIN: HCPCS | Performed by: INTERNAL MEDICINE

## 2023-05-16 PROCEDURE — 3079F DIAST BP 80-89 MM HG: CPT | Performed by: INTERNAL MEDICINE

## 2023-05-16 PROCEDURE — 99497 ADVNCD CARE PLAN 30 MIN: CPT | Performed by: INTERNAL MEDICINE

## 2023-05-16 RX ORDER — AMLODIPINE BESYLATE 5 MG/1
5 TABLET ORAL DAILY
Qty: 90 TABLET | Refills: 3 | Status: SHIPPED | OUTPATIENT
Start: 2023-05-16

## 2023-05-16 RX ORDER — NITROFURANTOIN 25; 75 MG/1; MG/1
100 CAPSULE ORAL 2 TIMES DAILY
Qty: 20 CAPSULE | Refills: 0 | Status: SHIPPED | OUTPATIENT
Start: 2023-05-16 | End: 2023-05-26

## 2023-05-16 RX ORDER — ATORVASTATIN CALCIUM 10 MG/1
10 TABLET, FILM COATED ORAL DAILY
Qty: 90 TABLET | Refills: 3 | Status: SHIPPED | OUTPATIENT
Start: 2023-05-16

## 2023-05-16 RX ORDER — ESTRADIOL 0.03 MG/D
1 FILM, EXTENDED RELEASE TRANSDERMAL WEEKLY
Qty: 8 PATCH | Refills: 3 | Status: SHIPPED | OUTPATIENT
Start: 2023-05-16

## 2023-05-16 NOTE — PROGRESS NOTES
Medicare Annual Wellness Visit    Dulce Maria Jason is here for Medicare AWV (5-10-23), Blood Sugar Problem, Hyperlipidemia, and Gastroesophageal Reflux    Assessment & Plan   Primary hypertension  -     amLODIPine (NORVASC) 5 MG tablet; Take 1 tablet by mouth daily, Disp-90 tablet, R-3Normal  GERD without esophagitis  IFG (impaired fasting glucose)  -     CBC with Auto Differential; Future  -     Comprehensive Metabolic Panel; Future  -     HEMOGLOBIN A1C W/O EAG; Future  Hyperlipidemia, unspecified hyperlipidemia type  -     atorvastatin (LIPITOR) 10 MG tablet; Take 1 tablet by mouth daily, Disp-90 tablet, R-3Normal  -     Lipid Panel; Future  Overweight (BMI 25.0-29. 9)  Acute cystitis without hematuria  -     nitrofurantoin, macrocrystal-monohydrate, (MACROBID) 100 MG capsule; Take 1 capsule by mouth 2 times daily for 10 days, Disp-20 capsule, R-0Normal  Medicare annual wellness visit, subsequent      Recommendations for Preventive Services Due: see orders and patient instructions/AVS.  Recommended screening schedule for the next 5-10 years is provided to the patient in written form: see Patient Instructions/AVS.     Return for Medicare Annual Wellness Visit in 1 year. Subjective       Patient's complete Health Risk Assessment and screening values have been reviewed and are found in Flowsheets. The following problems were reviewed today and where indicated follow up appointments were made and/or referrals ordered. Positive Risk Factor Screenings with Interventions:                                       Objective   Vitals:    05/16/23 1126 05/16/23 1154   BP: (!) 142/84 136/82   Pulse: 91    Resp: 19    Temp: 97.9 °F (36.6 °C)    TempSrc: Temporal    SpO2: 98%    Weight: 152 lb (68.9 kg)    Height: 5' 3\" (1.6 m)       Body mass index is 26.93 kg/m².                Allergies   Allergen Reactions    Phentermine-Topiramate Other (See Comments)     Insomnia     Prior to Visit Medications    Medication Sig

## 2023-05-19 ENCOUNTER — TELEPHONE (OUTPATIENT)
Age: 68
End: 2023-05-19

## 2023-09-10 DIAGNOSIS — M19.90 OSTEOARTHRITIS, UNSPECIFIED OSTEOARTHRITIS TYPE, UNSPECIFIED SITE: ICD-10-CM

## 2023-09-14 RX ORDER — GABAPENTIN 600 MG/1
600 TABLET ORAL 3 TIMES DAILY
Qty: 270 TABLET | Refills: 1 | Status: SHIPPED | OUTPATIENT
Start: 2023-09-14 | End: 2024-03-12

## 2024-03-14 ENCOUNTER — HOSPITAL ENCOUNTER (OUTPATIENT)
Facility: HOSPITAL | Age: 69
Discharge: HOME | End: 2024-03-14
Payer: MEDICARE

## 2024-03-14 VITALS — BODY MASS INDEX: 26.93 KG/M2 | HEIGHT: 63 IN

## 2024-03-14 DIAGNOSIS — Z36.9 1ST TRIMESTER SCREENING: ICD-10-CM

## 2024-03-14 PROCEDURE — 77063 BREAST TOMOSYNTHESIS BI: CPT

## 2024-05-09 ENCOUNTER — HOSPITAL ENCOUNTER (OUTPATIENT)
Facility: HOSPITAL | Age: 69
Setting detail: SPECIMEN
Discharge: HOME | End: 2024-05-09
Payer: MEDICARE

## 2024-05-09 DIAGNOSIS — R73.01 IFG (IMPAIRED FASTING GLUCOSE): ICD-10-CM

## 2024-05-09 DIAGNOSIS — E78.5 HYPERLIPIDEMIA, UNSPECIFIED HYPERLIPIDEMIA TYPE: ICD-10-CM

## 2024-05-09 LAB
ALBUMIN SERPL-MCNC: 3.6 G/DL (ref 3.4–5)
ALBUMIN/GLOB SERPL: 0.9 (ref 0.8–1.7)
ALP SERPL-CCNC: 97 U/L (ref 45–117)
ALT SERPL-CCNC: 31 U/L (ref 13–56)
ANION GAP SERPL CALC-SCNC: 2 MMOL/L (ref 3–18)
AST SERPL-CCNC: 25 U/L (ref 10–38)
BASOPHILS # BLD: 0.1 K/UL (ref 0–0.1)
BASOPHILS NFR BLD: 1 % (ref 0–2)
BILIRUB SERPL-MCNC: 1.5 MG/DL (ref 0.2–1)
BUN SERPL-MCNC: 15 MG/DL (ref 7–18)
BUN/CREAT SERPL: 17 (ref 12–20)
CALCIUM SERPL-MCNC: 9.7 MG/DL (ref 8.5–10.1)
CHLORIDE SERPL-SCNC: 107 MMOL/L (ref 100–111)
CHOLEST SERPL-MCNC: 178 MG/DL
CO2 SERPL-SCNC: 31 MMOL/L (ref 21–32)
CREAT SERPL-MCNC: 0.88 MG/DL (ref 0.6–1.3)
DIFFERENTIAL METHOD BLD: NORMAL
EOSINOPHIL # BLD: 0.3 K/UL (ref 0–0.4)
EOSINOPHIL NFR BLD: 4 % (ref 0–5)
ERYTHROCYTE [DISTWIDTH] IN BLOOD BY AUTOMATED COUNT: 12.8 % (ref 11.6–14.5)
GLOBULIN SER CALC-MCNC: 3.9 G/DL (ref 2–4)
GLUCOSE SERPL-MCNC: 110 MG/DL (ref 74–99)
HBA1C MFR BLD: 5.5 % (ref 4.2–5.6)
HCT VFR BLD AUTO: 41.8 % (ref 35–45)
HDLC SERPL-MCNC: 51 MG/DL (ref 40–60)
HDLC SERPL: 3.5 (ref 0–5)
HGB BLD-MCNC: 13.7 G/DL (ref 12–16)
IMM GRANULOCYTES # BLD AUTO: 0 K/UL (ref 0–0.04)
IMM GRANULOCYTES NFR BLD AUTO: 0 % (ref 0–0.5)
LDLC SERPL CALC-MCNC: 81.2 MG/DL (ref 0–100)
LIPID PANEL: ABNORMAL
LYMPHOCYTES # BLD: 2.4 K/UL (ref 0.9–3.6)
LYMPHOCYTES NFR BLD: 33 % (ref 21–52)
MCH RBC QN AUTO: 30.5 PG (ref 24–34)
MCHC RBC AUTO-ENTMCNC: 32.8 G/DL (ref 31–37)
MCV RBC AUTO: 93.1 FL (ref 78–100)
MONOCYTES # BLD: 0.5 K/UL (ref 0.05–1.2)
MONOCYTES NFR BLD: 6 % (ref 3–10)
NEUTS SEG # BLD: 4 K/UL (ref 1.8–8)
NEUTS SEG NFR BLD: 55 % (ref 40–73)
NRBC # BLD: 0 K/UL (ref 0–0.01)
NRBC BLD-RTO: 0 PER 100 WBC
PLATELET # BLD AUTO: 282 K/UL (ref 135–420)
PMV BLD AUTO: 10.7 FL (ref 9.2–11.8)
POTASSIUM SERPL-SCNC: 4.7 MMOL/L (ref 3.5–5.5)
PROT SERPL-MCNC: 7.5 G/DL (ref 6.4–8.2)
RBC # BLD AUTO: 4.49 M/UL (ref 4.2–5.3)
SODIUM SERPL-SCNC: 140 MMOL/L (ref 136–145)
TRIGL SERPL-MCNC: 229 MG/DL
VLDLC SERPL CALC-MCNC: 45.8 MG/DL
WBC # BLD AUTO: 7.2 K/UL (ref 4.6–13.2)

## 2024-05-09 PROCEDURE — 80061 LIPID PANEL: CPT

## 2024-05-09 PROCEDURE — 80053 COMPREHEN METABOLIC PANEL: CPT

## 2024-05-09 PROCEDURE — 85025 COMPLETE CBC W/AUTO DIFF WBC: CPT

## 2024-05-09 PROCEDURE — 36415 COLL VENOUS BLD VENIPUNCTURE: CPT

## 2024-05-09 PROCEDURE — 83036 HEMOGLOBIN GLYCOSYLATED A1C: CPT

## 2024-05-13 SDOH — ECONOMIC STABILITY: FOOD INSECURITY: WITHIN THE PAST 12 MONTHS, THE FOOD YOU BOUGHT JUST DIDN'T LAST AND YOU DIDN'T HAVE MONEY TO GET MORE.: NEVER TRUE

## 2024-05-13 SDOH — ECONOMIC STABILITY: FOOD INSECURITY: WITHIN THE PAST 12 MONTHS, YOU WORRIED THAT YOUR FOOD WOULD RUN OUT BEFORE YOU GOT MONEY TO BUY MORE.: NEVER TRUE

## 2024-05-13 SDOH — HEALTH STABILITY: PHYSICAL HEALTH: ON AVERAGE, HOW MANY MINUTES DO YOU ENGAGE IN EXERCISE AT THIS LEVEL?: 0 MIN

## 2024-05-13 SDOH — ECONOMIC STABILITY: INCOME INSECURITY: HOW HARD IS IT FOR YOU TO PAY FOR THE VERY BASICS LIKE FOOD, HOUSING, MEDICAL CARE, AND HEATING?: NOT HARD AT ALL

## 2024-05-13 SDOH — HEALTH STABILITY: PHYSICAL HEALTH: ON AVERAGE, HOW MANY DAYS PER WEEK DO YOU ENGAGE IN MODERATE TO STRENUOUS EXERCISE (LIKE A BRISK WALK)?: 0 DAYS

## 2024-05-13 ASSESSMENT — PATIENT HEALTH QUESTIONNAIRE - PHQ9
2. FEELING DOWN, DEPRESSED OR HOPELESS: NOT AT ALL
SUM OF ALL RESPONSES TO PHQ QUESTIONS 1-9: 0
1. LITTLE INTEREST OR PLEASURE IN DOING THINGS: NOT AT ALL
SUM OF ALL RESPONSES TO PHQ9 QUESTIONS 1 & 2: 0
SUM OF ALL RESPONSES TO PHQ QUESTIONS 1-9: 0

## 2024-05-13 ASSESSMENT — LIFESTYLE VARIABLES
HOW OFTEN DO YOU HAVE A DRINK CONTAINING ALCOHOL: 2
HOW MANY STANDARD DRINKS CONTAINING ALCOHOL DO YOU HAVE ON A TYPICAL DAY: 1
HOW OFTEN DO YOU HAVE A DRINK CONTAINING ALCOHOL: MONTHLY OR LESS
HOW OFTEN DO YOU HAVE SIX OR MORE DRINKS ON ONE OCCASION: 1
HOW MANY STANDARD DRINKS CONTAINING ALCOHOL DO YOU HAVE ON A TYPICAL DAY: 1 OR 2

## 2024-05-16 ENCOUNTER — TELEPHONE (OUTPATIENT)
Age: 69
End: 2024-05-16

## 2024-05-16 ENCOUNTER — HOSPITAL ENCOUNTER (OUTPATIENT)
Facility: HOSPITAL | Age: 69
Setting detail: SPECIMEN
Discharge: HOME | End: 2024-05-16
Payer: MEDICARE

## 2024-05-16 ENCOUNTER — OFFICE VISIT (OUTPATIENT)
Age: 69
End: 2024-05-16

## 2024-05-16 VITALS
OXYGEN SATURATION: 98 % | DIASTOLIC BLOOD PRESSURE: 78 MMHG | SYSTOLIC BLOOD PRESSURE: 155 MMHG | TEMPERATURE: 98.2 F | HEART RATE: 97 BPM | WEIGHT: 156 LBS | RESPIRATION RATE: 16 BRPM | BODY MASS INDEX: 27.64 KG/M2 | HEIGHT: 63 IN

## 2024-05-16 DIAGNOSIS — I10 PRIMARY HYPERTENSION: ICD-10-CM

## 2024-05-16 DIAGNOSIS — Z00.00 MEDICARE ANNUAL WELLNESS VISIT, SUBSEQUENT: Primary | ICD-10-CM

## 2024-05-16 DIAGNOSIS — E78.5 HYPERLIPIDEMIA, UNSPECIFIED HYPERLIPIDEMIA TYPE: ICD-10-CM

## 2024-05-16 DIAGNOSIS — Z71.89 ADVANCED CARE PLANNING/COUNSELING DISCUSSION: ICD-10-CM

## 2024-05-16 DIAGNOSIS — I73.9 CLAUDICATION (HCC): ICD-10-CM

## 2024-05-16 DIAGNOSIS — K21.9 GERD WITHOUT ESOPHAGITIS: ICD-10-CM

## 2024-05-16 DIAGNOSIS — R35.0 URINARY FREQUENCY: ICD-10-CM

## 2024-05-16 DIAGNOSIS — E66.3 OVERWEIGHT (BMI 25.0-29.9): ICD-10-CM

## 2024-05-16 LAB
BILIRUBIN, URINE, POC: NEGATIVE
BLOOD URINE, POC: NEGATIVE
GLUCOSE URINE, POC: NEGATIVE
KETONES, URINE, POC: NEGATIVE
LEUKOCYTE ESTERASE, URINE, POC: NEGATIVE
NITRITE, URINE, POC: NORMAL
PH, URINE, POC: 7 (ref 4.6–8)
PROTEIN,URINE, POC: NEGATIVE
SPECIFIC GRAVITY, URINE, POC: 1.02 (ref 1–1.03)
URINALYSIS CLARITY, POC: CLEAR
URINALYSIS COLOR, POC: YELLOW
UROBILINOGEN, POC: NORMAL

## 2024-05-16 PROCEDURE — 87086 URINE CULTURE/COLONY COUNT: CPT

## 2024-05-16 RX ORDER — AMLODIPINE BESYLATE 10 MG/1
10 TABLET ORAL DAILY
Qty: 90 TABLET | Refills: 3 | Status: SHIPPED | OUTPATIENT
Start: 2024-05-16

## 2024-05-16 RX ORDER — SULFAMETHOXAZOLE AND TRIMETHOPRIM 800; 160 MG/1; MG/1
1 TABLET ORAL 2 TIMES DAILY
Qty: 10 TABLET | Refills: 0 | Status: SHIPPED | OUTPATIENT
Start: 2024-05-16 | End: 2024-05-21

## 2024-05-16 NOTE — TELEPHONE ENCOUNTER
----- Message from Jeremy Medina MD sent at 5/16/2024  1:01 PM EDT -----  Pls fax vascular duplex order to Aj at [facility]    Also, mail the order form to pts house

## 2024-05-16 NOTE — PROGRESS NOTES
Medicare Annual Wellness Visit    Chen Gruber is here for Medicare AWV    Assessment & Plan   Urinary frequency  -     AMB POC URINALYSIS DIP STICK AUTO W/ MICRO  -     Culture, Urine; Future  -     sulfamethoxazole-trimethoprim (BACTRIM DS;SEPTRA DS) 800-160 MG per tablet; Take 1 tablet by mouth 2 times daily for 5 days, Disp-10 tablet, R-0Normal  Advanced care planning/counseling discussion  Primary hypertension  -     amLODIPine (NORVASC) 10 MG tablet; Take 1 tablet by mouth daily, Disp-90 tablet, R-3Normal  GERD without esophagitis  Hyperlipidemia, unspecified hyperlipidemia type  Overweight (BMI 25.0-29.9)  Claudication (HCC)  -     Vascular duplex lower extremity arteries bilateral with MELISSA; Future  Medicare annual wellness visit, subsequent    Recommendations for Preventive Services Due: see orders and patient instructions/AVS.  Recommended screening schedule for the next 5-10 years is provided to the patient in written form: see Patient Instructions/AVS.     Return for Medicare Annual Wellness Visit in 1 year.     Subjective       Patient's complete Health Risk Assessment and screening values have been reviewed and are found in Flowsheets. The following problems were reviewed today and where indicated follow up appointments were made and/or referrals ordered.    Positive Risk Factor Screenings with Interventions:                Activity, Diet, and Weight:  On average, how many days per week do you engage in moderate to strenuous exercise (like a brisk walk)?: 0 days  On average, how many minutes do you engage in exercise at this level?: 0 min    Do you eat balanced/healthy meals regularly?: Yes    Body mass index is 27.64 kg/m².      Inactivity Interventions:  Patient declined any further interventions or treatment           Safety:  Do you have any tripping hazards - loose or unsecured carpets or rugs?: (!) Yes  Interventions:  Patient declined any further interventions or treatment              
Chen Gruber presents today for   Chief Complaint   Patient presents with    Medicare AWV       \"Have you been to the ER, urgent care clinic since your last visit?  Hospitalized since your last visit?\"    NO    “Have you seen or consulted any other health care providers outside of [facility] since your last visit?”    YES - When: approximately 1 months ago.  Where and Why: [facility], gynecology.             
 Lifestyle and dietary measures.  Portion control reiterated.  Cut back carbs, inc exercise as tolerated  9.  HTN.  Inc amlo to 10 and call in readings, adjust as indicated  10. Spine s/p spinal cord stimulator.  She will talk to Dr Andrews about her recurring back issues  11.  RSV and tdap advocated   12. Empiric bactrim pending urine culture  13. R/o claudication.  Duplex study ordered      RTC 5/25    Above conditions discussed at length and patient vocalized understanding.  All questions answered to patient satisfaction     Diagnosis Orders   1. Urinary frequency  AMB POC URINALYSIS DIP STICK AUTO W/ MICRO    Culture, Urine    Culture, Urine    sulfamethoxazole-trimethoprim (BACTRIM DS;SEPTRA DS) 800-160 MG per tablet      2. Advanced care planning/counseling discussion        3. Primary hypertension  amLODIPine (NORVASC) 10 MG tablet      4. GERD without esophagitis        5. Hyperlipidemia, unspecified hyperlipidemia type        6. Overweight (BMI 25.0-29.9)        7. Claudication (HCC)  Vascular duplex lower extremity arteries bilateral with MELISSA

## 2024-05-16 NOTE — PATIENT INSTRUCTIONS
Learning About Being Active as an Older Adult  Why is being active important as you get older?     Being active is one of the best things you can do for your health. And it's never too late to start. Being active--or getting active, if you aren't already--has definite benefits. It can:  Give you more energy,  Keep your mind sharp.  Improve balance to reduce your risk of falls.  Help you manage chronic illness with fewer medicines.  No matter how old you are, how fit you are, or what health problems you have, there is a form of activity that will work for you. And the more physical activity you can do, the better your overall health will be.  What kinds of activity can help you stay healthy?  Being more active will make your daily activities easier. Physical activity includes planned exercise and things you do in daily life. There are four types of activity:  Aerobic.  Doing aerobic activity makes your heart and lungs strong.  Includes walking, dancing, and gardening.  Aim for at least 2½ hours spread throughout the week.  It improves your energy and can help you sleep better.  Muscle-strengthening.  This type of activity can help maintain muscle and strengthen bones.  Includes climbing stairs, using resistance bands, and lifting or carrying heavy loads.  Aim for at least twice a week.  It can help protect the knees and other joints.  Stretching.  Stretching gives you better range of motion in joints and muscles.  Includes upper arm stretches, calf stretches, and gentle yoga.  Aim for at least twice a week, preferably after your muscles are warmed up from other activities.  It can help you function better in daily life.  Balancing.  This helps you stay coordinated and have good posture.  Includes heel-to-toe walking, efrain chi, and certain types of yoga.  Aim for at least 3 days a week.  It can reduce your risk of falling.  Even if you have a hard time meeting the recommendations, it's better to be more active

## 2024-05-17 DIAGNOSIS — E78.5 HYPERLIPIDEMIA, UNSPECIFIED HYPERLIPIDEMIA TYPE: ICD-10-CM

## 2024-05-17 LAB
BACTERIA SPEC CULT: NORMAL
CC UR VC: NORMAL
SERVICE CMNT-IMP: NORMAL

## 2024-05-19 ENCOUNTER — TELEPHONE (OUTPATIENT)
Age: 69
End: 2024-05-19

## 2024-05-21 RX ORDER — ATORVASTATIN CALCIUM 10 MG/1
10 TABLET, FILM COATED ORAL DAILY
Qty: 90 TABLET | Refills: 3 | Status: SHIPPED | OUTPATIENT
Start: 2024-05-21

## 2024-07-08 DIAGNOSIS — M19.90 OSTEOARTHRITIS, UNSPECIFIED OSTEOARTHRITIS TYPE, UNSPECIFIED SITE: ICD-10-CM

## 2024-07-09 RX ORDER — GABAPENTIN 600 MG/1
600 TABLET ORAL 3 TIMES DAILY
Qty: 270 TABLET | Refills: 1 | Status: SHIPPED | OUTPATIENT
Start: 2024-07-09 | End: 2025-01-05

## 2025-02-22 DIAGNOSIS — M19.90 OSTEOARTHRITIS, UNSPECIFIED OSTEOARTHRITIS TYPE, UNSPECIFIED SITE: ICD-10-CM

## 2025-02-24 RX ORDER — GABAPENTIN 600 MG/1
600 TABLET ORAL 3 TIMES DAILY
Qty: 270 TABLET | Refills: 1 | Status: SHIPPED | OUTPATIENT
Start: 2025-02-24 | End: 2025-08-23

## 2025-04-23 ENCOUNTER — HOSPITAL ENCOUNTER (OUTPATIENT)
Facility: HOSPITAL | Age: 70
Discharge: HOME | End: 2025-04-26
Payer: MEDICARE

## 2025-04-23 VITALS — HEIGHT: 63 IN | BODY MASS INDEX: 27.29 KG/M2 | WEIGHT: 154 LBS

## 2025-04-23 DIAGNOSIS — Z12.31 VISIT FOR SCREENING MAMMOGRAM: ICD-10-CM

## 2025-04-23 PROCEDURE — 77063 BREAST TOMOSYNTHESIS BI: CPT

## 2025-05-05 ENCOUNTER — TELEPHONE (OUTPATIENT)
Facility: CLINIC | Age: 70
End: 2025-05-05

## 2025-05-05 DIAGNOSIS — R73.01 IFG (IMPAIRED FASTING GLUCOSE): Primary | ICD-10-CM

## 2025-05-05 DIAGNOSIS — I10 PRIMARY HYPERTENSION: ICD-10-CM

## 2025-05-05 DIAGNOSIS — E78.5 HYPERLIPIDEMIA, UNSPECIFIED HYPERLIPIDEMIA TYPE: ICD-10-CM

## 2025-05-12 ENCOUNTER — HOSPITAL ENCOUNTER (OUTPATIENT)
Facility: HOSPITAL | Age: 70
Setting detail: SPECIMEN
Discharge: HOME | End: 2025-05-15
Payer: MEDICARE

## 2025-05-12 DIAGNOSIS — I10 PRIMARY HYPERTENSION: ICD-10-CM

## 2025-05-12 DIAGNOSIS — R73.01 IFG (IMPAIRED FASTING GLUCOSE): ICD-10-CM

## 2025-05-12 DIAGNOSIS — E78.5 HYPERLIPIDEMIA, UNSPECIFIED HYPERLIPIDEMIA TYPE: ICD-10-CM

## 2025-05-12 LAB
ALBUMIN SERPL-MCNC: 3.7 G/DL (ref 3.4–5)
ALBUMIN/GLOB SERPL: 1.1 (ref 0.8–1.7)
ALP SERPL-CCNC: 101 U/L (ref 45–117)
ALT SERPL-CCNC: 30 U/L (ref 10–35)
ANION GAP SERPL CALC-SCNC: 10 MMOL/L (ref 3–18)
AST SERPL-CCNC: 27 U/L (ref 10–38)
BASOPHILS # BLD: 0.07 K/UL (ref 0–0.1)
BASOPHILS NFR BLD: 1 % (ref 0–2)
BILIRUB SERPL-MCNC: 1.1 MG/DL (ref 0.2–1)
BUN SERPL-MCNC: 18 MG/DL (ref 6–23)
BUN/CREAT SERPL: 21 (ref 12–20)
CALCIUM SERPL-MCNC: 9.7 MG/DL (ref 8.5–10.1)
CHLORIDE SERPL-SCNC: 103 MMOL/L (ref 98–107)
CHOLEST SERPL-MCNC: 178 MG/DL
CO2 SERPL-SCNC: 28 MMOL/L (ref 21–32)
CREAT SERPL-MCNC: 0.83 MG/DL (ref 0.6–1.3)
DIFFERENTIAL METHOD BLD: NORMAL
EOSINOPHIL # BLD: 0.23 K/UL (ref 0–0.4)
EOSINOPHIL NFR BLD: 3.2 % (ref 0–5)
ERYTHROCYTE [DISTWIDTH] IN BLOOD BY AUTOMATED COUNT: 12.9 % (ref 11.6–14.5)
EST. AVERAGE GLUCOSE BLD GHB EST-MCNC: 120 MG/DL
GLOBULIN SER CALC-MCNC: 3.4 G/DL (ref 2–4)
GLUCOSE SERPL-MCNC: 103 MG/DL (ref 74–108)
HBA1C MFR BLD: 5.8 % (ref 4.2–5.6)
HCT VFR BLD AUTO: 42 % (ref 35–45)
HDLC SERPL-MCNC: 54 MG/DL (ref 40–60)
HDLC SERPL: 3.3 (ref 0–5)
HGB BLD-MCNC: 13.9 G/DL (ref 12–16)
IMM GRANULOCYTES # BLD AUTO: 0.03 K/UL (ref 0–0.04)
IMM GRANULOCYTES NFR BLD AUTO: 0.4 % (ref 0–0.5)
LDLC SERPL CALC-MCNC: 91 MG/DL (ref 0–100)
LYMPHOCYTES # BLD: 1.91 K/UL (ref 0.9–3.6)
LYMPHOCYTES NFR BLD: 26.6 % (ref 21–52)
MCH RBC QN AUTO: 30.5 PG (ref 24–34)
MCHC RBC AUTO-ENTMCNC: 33.1 G/DL (ref 31–37)
MCV RBC AUTO: 92.1 FL (ref 78–100)
MONOCYTES # BLD: 0.4 K/UL (ref 0.05–1.2)
MONOCYTES NFR BLD: 5.6 % (ref 3–10)
NEUTS SEG # BLD: 4.53 K/UL (ref 1.8–8)
NEUTS SEG NFR BLD: 63.2 % (ref 40–73)
NRBC # BLD: 0 K/UL (ref 0–0.01)
NRBC BLD-RTO: 0 PER 100 WBC
PLATELET # BLD AUTO: 300 K/UL (ref 135–420)
PMV BLD AUTO: 10.6 FL (ref 9.2–11.8)
POTASSIUM SERPL-SCNC: 4.5 MMOL/L (ref 3.5–5.5)
PROT SERPL-MCNC: 7.1 G/DL (ref 6.4–8.2)
RBC # BLD AUTO: 4.56 M/UL (ref 4.2–5.3)
SODIUM SERPL-SCNC: 141 MMOL/L (ref 136–145)
TRIGL SERPL-MCNC: 164 MG/DL (ref 0–150)
VLDLC SERPL CALC-MCNC: 33 MG/DL
WBC # BLD AUTO: 7.2 K/UL (ref 4.6–13.2)

## 2025-05-12 PROCEDURE — 83036 HEMOGLOBIN GLYCOSYLATED A1C: CPT

## 2025-05-12 PROCEDURE — 36415 COLL VENOUS BLD VENIPUNCTURE: CPT

## 2025-05-12 PROCEDURE — 80053 COMPREHEN METABOLIC PANEL: CPT

## 2025-05-12 PROCEDURE — 85025 COMPLETE CBC W/AUTO DIFF WBC: CPT

## 2025-05-12 PROCEDURE — 80061 LIPID PANEL: CPT

## 2025-05-16 SDOH — ECONOMIC STABILITY: FOOD INSECURITY: WITHIN THE PAST 12 MONTHS, THE FOOD YOU BOUGHT JUST DIDN'T LAST AND YOU DIDN'T HAVE MONEY TO GET MORE.: NEVER TRUE

## 2025-05-16 SDOH — ECONOMIC STABILITY: INCOME INSECURITY: IN THE LAST 12 MONTHS, WAS THERE A TIME WHEN YOU WERE NOT ABLE TO PAY THE MORTGAGE OR RENT ON TIME?: NO

## 2025-05-16 SDOH — ECONOMIC STABILITY: FOOD INSECURITY: WITHIN THE PAST 12 MONTHS, YOU WORRIED THAT YOUR FOOD WOULD RUN OUT BEFORE YOU GOT MONEY TO BUY MORE.: NEVER TRUE

## 2025-05-16 SDOH — HEALTH STABILITY: PHYSICAL HEALTH: ON AVERAGE, HOW MANY DAYS PER WEEK DO YOU ENGAGE IN MODERATE TO STRENUOUS EXERCISE (LIKE A BRISK WALK)?: 0 DAYS

## 2025-05-16 SDOH — ECONOMIC STABILITY: TRANSPORTATION INSECURITY
IN THE PAST 12 MONTHS, HAS THE LACK OF TRANSPORTATION KEPT YOU FROM MEDICAL APPOINTMENTS OR FROM GETTING MEDICATIONS?: NO

## 2025-05-16 ASSESSMENT — PATIENT HEALTH QUESTIONNAIRE - PHQ9
1. LITTLE INTEREST OR PLEASURE IN DOING THINGS: NOT AT ALL
2. FEELING DOWN, DEPRESSED OR HOPELESS: NOT AT ALL
SUM OF ALL RESPONSES TO PHQ QUESTIONS 1-9: 0

## 2025-05-16 ASSESSMENT — LIFESTYLE VARIABLES
HOW MANY STANDARD DRINKS CONTAINING ALCOHOL DO YOU HAVE ON A TYPICAL DAY: PATIENT DOES NOT DRINK
HOW OFTEN DO YOU HAVE A DRINK CONTAINING ALCOHOL: NEVER
HOW OFTEN DO YOU HAVE SIX OR MORE DRINKS ON ONE OCCASION: 1
HOW MANY STANDARD DRINKS CONTAINING ALCOHOL DO YOU HAVE ON A TYPICAL DAY: 0
HOW OFTEN DO YOU HAVE A DRINK CONTAINING ALCOHOL: 1

## 2025-05-19 ENCOUNTER — OFFICE VISIT (OUTPATIENT)
Facility: CLINIC | Age: 70
End: 2025-05-19
Payer: MEDICARE

## 2025-05-19 VITALS
WEIGHT: 153 LBS | HEART RATE: 96 BPM | RESPIRATION RATE: 16 BRPM | HEIGHT: 63 IN | BODY MASS INDEX: 27.11 KG/M2 | OXYGEN SATURATION: 98 % | DIASTOLIC BLOOD PRESSURE: 81 MMHG | SYSTOLIC BLOOD PRESSURE: 153 MMHG | TEMPERATURE: 97.2 F

## 2025-05-19 DIAGNOSIS — E78.5 HYPERLIPIDEMIA, UNSPECIFIED HYPERLIPIDEMIA TYPE: ICD-10-CM

## 2025-05-19 DIAGNOSIS — I10 PRIMARY HYPERTENSION: ICD-10-CM

## 2025-05-19 DIAGNOSIS — M47.26 OSTEOARTHRITIS OF SPINE WITH RADICULOPATHY, LUMBAR REGION: ICD-10-CM

## 2025-05-19 DIAGNOSIS — E66.3 OVERWEIGHT (BMI 25.0-29.9): ICD-10-CM

## 2025-05-19 DIAGNOSIS — R73.01 IFG (IMPAIRED FASTING GLUCOSE): ICD-10-CM

## 2025-05-19 DIAGNOSIS — Z71.89 ADVANCED CARE PLANNING/COUNSELING DISCUSSION: ICD-10-CM

## 2025-05-19 DIAGNOSIS — Z00.00 MEDICARE ANNUAL WELLNESS VISIT, SUBSEQUENT: Primary | ICD-10-CM

## 2025-05-19 DIAGNOSIS — M19.90 OSTEOARTHRITIS, UNSPECIFIED OSTEOARTHRITIS TYPE, UNSPECIFIED SITE: ICD-10-CM

## 2025-05-19 PROCEDURE — 1159F MED LIST DOCD IN RCRD: CPT | Performed by: INTERNAL MEDICINE

## 2025-05-19 PROCEDURE — 99214 OFFICE O/P EST MOD 30 MIN: CPT | Performed by: INTERNAL MEDICINE

## 2025-05-19 PROCEDURE — G8399 PT W/DXA RESULTS DOCUMENT: HCPCS | Performed by: INTERNAL MEDICINE

## 2025-05-19 PROCEDURE — 3078F DIAST BP <80 MM HG: CPT | Performed by: INTERNAL MEDICINE

## 2025-05-19 PROCEDURE — G8419 CALC BMI OUT NRM PARAM NOF/U: HCPCS | Performed by: INTERNAL MEDICINE

## 2025-05-19 PROCEDURE — 99497 ADVNCD CARE PLAN 30 MIN: CPT | Performed by: INTERNAL MEDICINE

## 2025-05-19 PROCEDURE — G0439 PPPS, SUBSEQ VISIT: HCPCS | Performed by: INTERNAL MEDICINE

## 2025-05-19 PROCEDURE — 3017F COLORECTAL CA SCREEN DOC REV: CPT | Performed by: INTERNAL MEDICINE

## 2025-05-19 PROCEDURE — 3077F SYST BP >= 140 MM HG: CPT | Performed by: INTERNAL MEDICINE

## 2025-05-19 PROCEDURE — 1123F ACP DISCUSS/DSCN MKR DOCD: CPT | Performed by: INTERNAL MEDICINE

## 2025-05-19 PROCEDURE — 1036F TOBACCO NON-USER: CPT | Performed by: INTERNAL MEDICINE

## 2025-05-19 PROCEDURE — G8427 DOCREV CUR MEDS BY ELIG CLIN: HCPCS | Performed by: INTERNAL MEDICINE

## 2025-05-19 PROCEDURE — 1090F PRES/ABSN URINE INCON ASSESS: CPT | Performed by: INTERNAL MEDICINE

## 2025-05-19 RX ORDER — ATORVASTATIN CALCIUM 10 MG/1
10 TABLET, FILM COATED ORAL DAILY
Qty: 90 TABLET | Refills: 3 | Status: SHIPPED | OUTPATIENT
Start: 2025-05-19

## 2025-05-19 RX ORDER — ESTRADIOL 0.1 MG/G
2 CREAM VAGINAL
COMMUNITY
Start: 2024-11-19

## 2025-05-19 RX ORDER — AMLODIPINE BESYLATE 10 MG/1
10 TABLET ORAL DAILY
Qty: 90 TABLET | Refills: 3 | Status: SHIPPED | OUTPATIENT
Start: 2025-05-19

## 2025-05-19 RX ORDER — HYDROCHLOROTHIAZIDE 25 MG/1
25 TABLET ORAL EVERY MORNING
Qty: 90 TABLET | Refills: 2 | Status: SHIPPED | OUTPATIENT
Start: 2025-05-19

## 2025-05-19 NOTE — ACP (ADVANCE CARE PLANNING)
Advance Care Planning     Advance Care Planning (ACP) Physician/NP/PA Conversation    Date of Conversation: 5/19/2025  Conducted with: Patient with Decision Making Capacity    Healthcare Decision Maker:      Primary Decision Maker: Debra Gruber - Daughter-in-Law - [phone]    Primary Decision Maker: Mani Gruber - Child - [phone]    Click here to complete Healthcare Decision Makers including selection of the Healthcare Decision Maker Relationship (ie \"Primary\")  Today we documented Decision Maker(s) consistent with Legal Next of Kin hierarchy.    Care Preferences:    Hospitalization:  \"If your health worsens and it becomes clear that your chance of recovery is unlikely, what would be your preference regarding hospitalization?\"  The patient would prefer comfort-focused treatment without hospitalization.    Ventilation:  \"If you were unable to breath on your own and your chance of recovery was unlikely, what would be your preference about the use of a ventilator (breathing machine) if it was available to you?\"  The patient would desire the use of a ventilator.    Resuscitation:  \"In the event your heart stopped as a result of an underlying serious health condition, would you want attempts made to restart your heart, or would you prefer a natural death?\"  Yes, attempt to resuscitate.    ventilation preferences, hospitalization preferences, and resuscitation preferences    Conversation Outcomes / Follow-Up Plan:  ACP in process - information provided, considering goals and options  Reviewed DNR/DNI and patient elects Full Code (Attempt Resuscitation)    Length of Voluntary ACP Conversation in minutes:  16 minutes    ELVIA BARRIENTOS MD

## 2025-05-19 NOTE — PROGRESS NOTES
Medicare Annual Wellness Visit    Chen Gruber is here for Medicare AWV    Assessment & Plan   IFG (impaired fasting glucose)  Hyperlipidemia, unspecified hyperlipidemia type  -     atorvastatin (LIPITOR) 10 MG tablet; Take 1 tablet by mouth daily, Disp-90 tablet, R-3Normal  Primary hypertension  -     amLODIPine (NORVASC) 10 MG tablet; Take 1 tablet by mouth daily, Disp-90 tablet, R-3Normal  -     hydroCHLOROthiazide (HYDRODIURIL) 25 MG tablet; Take 1 tablet by mouth every morning, Disp-90 tablet, R-2Normal  -     Basic Metabolic Panel; Future  Overweight (BMI 25.0-29.9)  Osteoarthritis, unspecified osteoarthritis type, unspecified site  Osteoarthritis of spine with radiculopathy, lumbar region  Medicare annual wellness visit, subsequent       Return in 4 months (on 9/19/2025).     Subjective       Patient's complete Health Risk Assessment and screening values have been reviewed and are found in Flowsheets. The following problems were reviewed today and where indicated follow up appointments were made and/or referrals ordered.    Positive Risk Factor Screenings with Interventions:              Inactivity:  On average, how many days per week do you engage in moderate to strenuous exercise (like a brisk walk)?: (Patient-Rptd) 0 days (!) Abnormal     Interventions:  Patient declined any further interventions or treatment         Safety:  Do you have any tripping hazards - loose or unsecured carpets or rugs?: (!) (Patient-Rptd) Yes  Interventions:  Patient declined any further interventions or treatment         Pt is not sexually active           Objective   Vitals:    05/19/25 1100 05/19/25 1104   BP: (!) 147/72 (!) 153/81   BP Site: Left Upper Arm Right Upper Arm   Patient Position: Sitting Sitting   BP Cuff Size: Medium Adult Medium Adult   Pulse: 95 96   Resp: 16    Temp: 97.2 °F (36.2 °C)    TempSrc: Temporal    SpO2: 98%    Weight: 69.4 kg (153 lb)    Height: 1.6 m (5' 3\")       Body mass index is 27.1 
Chen Gruber presents today for   Chief Complaint   Patient presents with    Medicare AWV       \"Have you been to the ER, urgent care clinic since your last visit?  Hospitalized since your last visit?\"    NO    “Have you seen or consulted any other health care providers outside of [facility] since your last visit?”    YES - When: approximately 1 months ago.  Where and Why: [facility]; follow-up.             
      chol 167, tg 148, hdl 67, ldl-c 70,  wbc 5.9, hb 12.9, plt 275, vit d 40.6  From 4/20 showed   gluc 92,   cr 0.86, gfr>60, alt 25,       chol 160, tg 168, hdl 62, ldl-c 64,  wbc 7.4, hb 13.5, plt 295  From 4/21 showed   gluc 95,   cr 0.91, gfr>60, alt 28, hba1c 5.4,      chol 180, tg 217, hdl 54, ldl-c 89,  wbc 7.7, hb 13.7, plt 299  From 4/22 showed   gluc 93,   cr 0.77, gfr>60, alt 29, hba1c 5.3,      chol 156, tg 115, hdl 60, ldl-c 73,  wbc 6.6, hb 13.0, plt 282  From 5/23 showed   gluc 97,   cr 0.83, gfr>60, alt 28, hba1c 5.6,      chol 147, tg 166, hdl 62, ldl-c 52,  wbc 6.4, hb 13.6, plt 280  From 5/24 showed   gluc 110, cr 0.88, gfr 71,  alt 31, hba1c 5.5,      chol 178, tg 229, hdl 51, ldl-c 81,  wbc 7.2, hb 13.7, plt 282    Results for orders placed or performed during the hospital encounter of 05/12/25 (from the past 2160 hours)   CBC with Auto Differential   Result Value Ref Range    WBC 7.2 4.6 - 13.2 K/uL    RBC 4.56 4.20 - 5.30 M/uL    Hemoglobin 13.9 12.0 - 16.0 g/dL    Hematocrit 42.0 35.0 - 45.0 %    MCV 92.1 78.0 - 100.0 FL    MCH 30.5 24.0 - 34.0 PG    MCHC 33.1 31.0 - 37.0 g/dL    RDW 12.9 11.6 - 14.5 %    Platelets 300 135 - 420 K/uL    MPV 10.6 9.2 - 11.8 FL    Nucleated RBCs 0.0 0  WBC    nRBC 0.00 0.00 - 0.01 K/uL    Neutrophils % 63.2 40.0 - 73.0 %    Lymphocytes % 26.6 21.0 - 52.0 %    Monocytes % 5.6 3.0 - 10.0 %    Eosinophils % 3.2 0.0 - 5.0 %    Basophils % 1.0 0.0 - 2.0 %    Immature Granulocytes % 0.4 0.0 - 0.5 %    Neutrophils Absolute 4.53 1.80 - 8.00 K/UL    Lymphocytes Absolute 1.91 0.90 - 3.60 K/UL    Monocytes Absolute 0.40 0.05 - 1.20 K/UL    Eosinophils Absolute 0.23 0.00 - 0.40 K/UL    Basophils Absolute 0.07 0.00 - 0.10 K/UL    Immature Granulocytes Absolute 0.03 0.00 - 0.04 K/UL    Differential Type AUTOMATED     Comprehensive Metabolic Panel   Result Value Ref Range    Sodium 141 136 - 145 mmol/L    Potassium 4.5 3.5 - 5.5 mmol/L    Chloride 103 98 - 107